# Patient Record
Sex: MALE | Race: WHITE | Employment: FULL TIME | URBAN - METROPOLITAN AREA
[De-identification: names, ages, dates, MRNs, and addresses within clinical notes are randomized per-mention and may not be internally consistent; named-entity substitution may affect disease eponyms.]

---

## 2022-09-08 ENCOUNTER — OFFICE VISIT (OUTPATIENT)
Dept: UROLOGY | Age: 42
End: 2022-09-08
Payer: COMMERCIAL

## 2022-09-08 DIAGNOSIS — N50.89 TESTICULAR MASS: Primary | ICD-10-CM

## 2022-09-08 DIAGNOSIS — Z30.09 VASECTOMY EVALUATION: ICD-10-CM

## 2022-09-08 LAB
BILIRUBIN, URINE, POC: NEGATIVE
BLOOD URINE, POC: NORMAL
GLUCOSE URINE, POC: NEGATIVE
KETONES, URINE, POC: NEGATIVE
LEUKOCYTE ESTERASE, URINE, POC: NEGATIVE
NITRITE, URINE, POC: NEGATIVE
PH, URINE, POC: 7 (ref 4.6–8)
PROTEIN,URINE, POC: NEGATIVE
SPECIFIC GRAVITY, URINE, POC: 1.02 (ref 1–1.03)
URINALYSIS CLARITY, POC: CLEAR
URINALYSIS COLOR, POC: YELLOW
UROBILINOGEN, POC: NORMAL

## 2022-09-08 PROCEDURE — 81003 URINALYSIS AUTO W/O SCOPE: CPT | Performed by: UROLOGY

## 2022-09-08 PROCEDURE — 99204 OFFICE O/P NEW MOD 45 MIN: CPT | Performed by: UROLOGY

## 2022-09-08 RX ORDER — TESTOSTERONE CYPIONATE 200 MG/ML
1 INJECTION INTRAMUSCULAR
COMMUNITY

## 2022-09-08 NOTE — PROGRESS NOTES
[facility]  [place] [address] [facility]  [phone]    Donn Nolan  : 1980     HPI   36 y.o., male returns in follow up for ***. Past Medical History:   Diagnosis Date    Hypercholesterolemia     high triglycerides    Hypertension     Sleep apnea     using CPAP     Past Surgical History:   Procedure Laterality Date    WISDOM TOOTH EXTRACTION       Current Outpatient Medications   Medication Sig Dispense Refill    testosterone cypionate (DEPOTESTOTERONE CYPIONATE) 200 MG/ML injection Inject 1 mg into the muscle. LISINOPRIL PO Take by mouth daily      chlorthalidone (HYGROTON) 25 MG tablet Take by mouth daily       No current facility-administered medications for this visit. No Known Allergies  Social History     Socioeconomic History    Marital status: Single     Spouse name: Not on file    Number of children: Not on file    Years of education: Not on file    Highest education level: Not on file   Occupational History    Not on file   Tobacco Use    Smoking status: Some Days    Smokeless tobacco: Never    Tobacco comments:     Quit smoking: 3-4 x month cigars   Substance and Sexual Activity    Alcohol use: Yes     Alcohol/week: 0.0 standard drinks    Drug use: No    Sexual activity: Not on file   Other Topics Concern    Not on file   Social History Narrative    caffeine intake -- 4 x week     Social Determinants of Health     Financial Resource Strain: Not on file   Food Insecurity: Not on file   Transportation Needs: Not on file   Physical Activity: Not on file   Stress: Not on file   Social Connections: Not on file   Intimate Partner Violence: Not on file   Housing Stability: Not on file     Family History   Problem Relation Age of Onset    Heart Disease Father     High Cholesterol Father     No Known Problems Brother        Review of Systems  All systems reviewed and are negative at this time.     Physical Exam  There were no vitals taken for this visit.  ***    Urinalysis  UA - Dipstick  Results for orders placed or performed in visit on 09/08/22   AMB POC URINALYSIS DIP STICK AUTO W/O MICRO   Result Value Ref Range    Color (UA POC) Yellow     Clarity (UA POC) Clear     Glucose, Urine, POC Negative Negative    Bilirubin, Urine, POC Negative Negative    KETONES, Urine, POC Negative Negative    Specific Gravity, Urine, POC 1.020 1.001 - 1.035    Blood (UA POC) Moderate Negative    pH, Urine, POC 7.0 4.6 - 8.0    Protein, Urine, POC Negative Negative    Urobilinogen, POC Normal     Nitrite, Urine, POC Negative Negative    Leukocyte Esterase, Urine, POC Negative Negative         UA - Micro  WBC - 0  RBC - 0  Bacteria - 0  Epith - 0    Assessment/Plan    ICD-10-CM    1. Testicular mass  N50.89 AMB POC URINALYSIS DIP STICK AUTO W/O MICRO     HCG, Tumor Marker     AFP Tumor Marker     Lactate Dehydrogenase     CT CHEST ABDOMEN PELVIS W CONTRAST      2.  Vasectomy evaluation  Z30.09           VALERY HICKEY, DO

## 2022-09-08 NOTE — PROGRESS NOTES
[facility][1] [facility]rology  [place], [address] [facility]  [phone]    Qing Avendano  : 1980     HPI   36 y.o., male seen in consultation for a R testicular mass. Pt seen for vas consult in . Never elected to have procedure due to cost.  Reports several wk history of R testicular pain and lower abd pain. Denies trauma. Reports R testis has become firmer over time. Denies wt loss. LOIS at [address] on 22 shows a diffusely abnormal R testis suspicious for testicular malignancy. His R epididymis is also abnormal and hypervascular. Epididymo-orchitis cannot be ruled out. He is still interested in a vasectomy. One child. Past Medical History:   Diagnosis Date    Hypercholesterolemia     high triglycerides    Hypertension     Sleep apnea     using CPAP     Past Surgical History:   Procedure Laterality Date    WISDOM TOOTH EXTRACTION       Current Outpatient Medications   Medication Sig Dispense Refill    testosterone cypionate (DEPOTESTOTERONE CYPIONATE) 200 MG/ML injection Inject 1 mg into the muscle. LISINOPRIL PO Take by mouth daily      chlorthalidone (HYGROTON) 25 MG tablet Take by mouth daily       No current facility-administered medications for this visit. No Known Allergies  Social History     Socioeconomic History    Marital status: Single     Spouse name: Not on file    Number of children: Not on file    Years of education: Not on file    Highest education level: Not on file   Occupational History    Not on file   Tobacco Use    Smoking status: Some Days    Smokeless tobacco: Never    Tobacco comments:     Quit smoking: 3-4 x month cigars   Substance and Sexual Activity    Alcohol use:  Yes     Alcohol/week: 0.0 standard drinks    Drug use: No    Sexual activity: Not on file   Other Topics Concern    Not on file   Social History Narrative    caffeine intake -- 4 x week     Social Determinants of Health     Financial Resource Strain: Not on file   Food Insecurity: Not on file   Transportation Needs: Not on file   Physical Activity: Not on file   Stress: Not on file   Social Connections: Not on file   Intimate Partner Violence: Not on file   Housing Stability: Not on file     Family History   Problem Relation Age of Onset    Heart Disease Father     High Cholesterol Father     No Known Problems Brother        Review of Systems  All systems reviewed and are negative at this time. Physical Exam  There were no vitals taken for this visit. General appearance - alert, well appearing, and in no distress  Mental status - alert, oriented to person, place, and time  Eyes - extraocular eye movements intact, sclera anicteric  Nose - normal and patent, no erythema, discharge or polyps  Mouth - mucous membranes moist  Abdomen - soft, nontender, nondistended, no masses or organomegaly  - Firm and enlarged R testis that resides in high R scrotum just distal to external ring. Lymphatic-  No palpable lymphadenopathy  Neurological -  normal speech, no focal findings or movement disorder noted  Musculoskeletal - no deformity or swelling  Extremities - no pedal edema, no clubbing or cyanosis  Skin - normal coloration and turgor      Urinalysis  UA - Dipstick  Results for orders placed or performed in visit on 09/08/22   AMB POC URINALYSIS DIP STICK AUTO W/O MICRO   Result Value Ref Range    Color (UA POC) Yellow     Clarity (UA POC) Clear     Glucose, Urine, POC Negative Negative    Bilirubin, Urine, POC Negative Negative    KETONES, Urine, POC Negative Negative    Specific Gravity, Urine, POC 1.020 1.001 - 1.035    Blood (UA POC) Moderate Negative    pH, Urine, POC 7.0 4.6 - 8.0    Protein, Urine, POC Negative Negative    Urobilinogen, POC Normal     Nitrite, Urine, POC Negative Negative    Leukocyte Esterase, Urine, POC Negative Negative         UA - Micro  WBC - 0  RBC - 5-10  Bacteria - 0  Epith - 0    Assessment/Plan    ICD-10-CM    1.  Testicular mass  N50.89 AMB POC URINALYSIS DIP STICK AUTO W/O MICRO     HCG, Tumor Marker     AFP Tumor Marker     Lactate Dehydrogenase     CT CHEST ABDOMEN PELVIS W CONTRAST        Exam and LOIS findings suspicious for malignancy. Will draw tumor markers and order CT C/A/P. Given that he is interested in a vasectomy we may scheduled this at [facility].  Case discussed with Dr. Irma Finch. All risks, benefits and alternatives to the above mentioned procedure were discussed and the patient is willing to proceed at this time. All risks, benefits and alternatives to the procedure were reviewed in detail with the patient and he is willing to proceed with a vasectomy. I did inform him that he is not cleared to discontinue contraception until he produces at least one semen analysis with azoospermia or rare non motile sperm following his vasectomy. I recommended that he bring his first post vasectomy semen specimen into the office no sooner than 8 weeks post op . I also informed him that this must be considered a permanent procedure. Should fertility be desired post vasectomy his options include vasectomy reversal and/or sperm retrieval.  I explained that these options can be expensive and not always successful. We discussed possible side effects during and following his vasectomy which include but are not limited to; bleeding, infection, hydrocele, sperm granuloma, acute pain, chronic pain, and/or testicular atrophy. I also discussed the risk of recanalization and the risk of pregnancy despite initial clearance of sperm and approval to stop contraception.        VALERY HICKEY, DO

## 2022-09-09 ENCOUNTER — NURSE ONLY (OUTPATIENT)
Dept: UROLOGY | Age: 42
End: 2022-09-09

## 2022-09-09 DIAGNOSIS — N50.89 TESTICULAR MASS: ICD-10-CM

## 2022-09-09 DIAGNOSIS — N50.89 TESTICULAR MASS: Primary | ICD-10-CM

## 2022-09-12 ENCOUNTER — NURSE ONLY (OUTPATIENT)
Dept: UROLOGY | Age: 42
End: 2022-09-12

## 2022-09-12 DIAGNOSIS — N50.89 TESTICULAR MASS: ICD-10-CM

## 2022-09-12 LAB
LDH SERPL L TO P-CCNC: 169 U/L (ref 100–190)
PSA SERPL-MCNC: 1.3 NG/ML

## 2022-09-13 LAB
AFP-TM SERPL-MCNC: 1.3 NG/ML
HCG INTACT+B SERPL-ACNC: 13 MIU/ML (ref 0–3)
TESTOST SERPL-MCNC: 418 NG/DL (ref 264–916)

## 2022-09-16 ENCOUNTER — HOSPITAL ENCOUNTER (OUTPATIENT)
Dept: CT IMAGING | Age: 42
Discharge: HOME | End: 2022-09-19

## 2022-09-16 ENCOUNTER — TELEPHONE (OUTPATIENT)
Dept: UROLOGY | Age: 42
End: 2022-09-16

## 2022-09-16 NOTE — TELEPHONE ENCOUNTER
----- Message from Jimi Rosa DO sent at 9/8/2022  5:12 PM EDT -----  Regarding: surg  R radical orchiectomy, L vasectomy  1h  Gen  Outpt  No labs  Ancef 2g iV preop  [facility] willing to do. Needs to be done within 2 wks.

## 2022-09-19 NOTE — TELEPHONE ENCOUNTER
[facility] can not accommodate for this week or next. Please look at your OR schedule and let me know where you want me to schedule this. You are full.   I could put with someone else if ok with you or oncall this wed night 9/21

## 2022-09-20 ENCOUNTER — PREP FOR PROCEDURE (OUTPATIENT)
Dept: UROLOGY | Age: 42
End: 2022-09-20

## 2022-09-20 DIAGNOSIS — N50.89 TESTICULAR MASS: Primary | ICD-10-CM

## 2022-09-20 RX ORDER — GEMFIBROZIL 600 MG/1
600 TABLET, FILM COATED ORAL 2 TIMES DAILY
COMMUNITY

## 2022-09-20 NOTE — PROGRESS NOTES
PLEASE CONTINUE TAKING ALL PRESCRIPTION MEDICATIONS UP TO THE DAY OF SURGERY UNLESS OTHERWISE DIRECTED BELOW. DISCONTINUE all vitamins and supplements 7 days prior to surgery. DISCONTINUE Non-Steriodal Anti-Inflammatory (NSAIDS) such as Advil and Aleve 5 days prior to surgery. Home Medications to take  the day of surgery    none           Home Medications   to Hold   Vitamins and supplements        Comments       On the day before surgery please take Acetaminophen 1000mg in the morning and then again before bed. You may substitute for Tylenol 650 mg. Please do not bring home medications with you on the day of surgery unless otherwise directed by your nurse. If you are instructed to bring home medications, please give them to your nurse as they will be administered by the nursing staff. If you have any questions, please call [facility] [phone] or [address] [place] [address] [phone]. A copy of this note was provided to the patient for reference.

## 2022-09-21 NOTE — PERIOP NOTE
Directly informed patient and or family member of pre op arrival time 0800 on 9/22. All questions answered. Pre op instructions reviewed. Left contact information for any additional questions or needs.

## 2022-09-22 ENCOUNTER — HOSPITAL ENCOUNTER (OUTPATIENT)
Age: 42
Setting detail: OUTPATIENT SURGERY
Discharge: HOME | End: 2022-09-22
Attending: UROLOGY | Admitting: UROLOGY
Payer: COMMERCIAL

## 2022-09-22 ENCOUNTER — ANESTHESIA EVENT (OUTPATIENT)
Dept: SURGERY | Age: 42
End: 2022-09-22
Payer: COMMERCIAL

## 2022-09-22 ENCOUNTER — ANESTHESIA (OUTPATIENT)
Dept: SURGERY | Age: 42
End: 2022-09-22
Payer: COMMERCIAL

## 2022-09-22 VITALS
HEART RATE: 88 BPM | HEIGHT: 71 IN | SYSTOLIC BLOOD PRESSURE: 114 MMHG | DIASTOLIC BLOOD PRESSURE: 68 MMHG | WEIGHT: 231.4 LBS | BODY MASS INDEX: 32.4 KG/M2 | OXYGEN SATURATION: 95 % | RESPIRATION RATE: 15 BRPM | TEMPERATURE: 97.2 F

## 2022-09-22 DIAGNOSIS — N50.89 SCROTAL MASS: ICD-10-CM

## 2022-09-22 DIAGNOSIS — N50.89 TESTICULAR MASS: Primary | ICD-10-CM

## 2022-09-22 LAB — POTASSIUM BLD-SCNC: 3.6 MMOL/L (ref 3.5–5.1)

## 2022-09-22 PROCEDURE — 6370000000 HC RX 637 (ALT 250 FOR IP): Performed by: ANESTHESIOLOGY

## 2022-09-22 PROCEDURE — 3600000002 HC SURGERY LEVEL 2 BASE: Performed by: UROLOGY

## 2022-09-22 PROCEDURE — 88309 TISSUE EXAM BY PATHOLOGIST: CPT

## 2022-09-22 PROCEDURE — 2580000003 HC RX 258: Performed by: ANESTHESIOLOGY

## 2022-09-22 PROCEDURE — 54530 REMOVAL OF TESTIS: CPT | Performed by: UROLOGY

## 2022-09-22 PROCEDURE — 3600000012 HC SURGERY LEVEL 2 ADDTL 15MIN: Performed by: UROLOGY

## 2022-09-22 PROCEDURE — 2500000003 HC RX 250 WO HCPCS: Performed by: NURSE ANESTHETIST, CERTIFIED REGISTERED

## 2022-09-22 PROCEDURE — 6360000002 HC RX W HCPCS: Performed by: NURSE ANESTHETIST, CERTIFIED REGISTERED

## 2022-09-22 PROCEDURE — 6360000002 HC RX W HCPCS: Performed by: UROLOGY

## 2022-09-22 PROCEDURE — 7100000001 HC PACU RECOVERY - ADDTL 15 MIN: Performed by: UROLOGY

## 2022-09-22 PROCEDURE — 7100000010 HC PHASE II RECOVERY - FIRST 15 MIN: Performed by: UROLOGY

## 2022-09-22 PROCEDURE — 2580000003 HC RX 258: Performed by: NURSE ANESTHETIST, CERTIFIED REGISTERED

## 2022-09-22 PROCEDURE — 7100000000 HC PACU RECOVERY - FIRST 15 MIN: Performed by: UROLOGY

## 2022-09-22 PROCEDURE — 3700000000 HC ANESTHESIA ATTENDED CARE: Performed by: UROLOGY

## 2022-09-22 PROCEDURE — 84132 ASSAY OF SERUM POTASSIUM: CPT

## 2022-09-22 PROCEDURE — 88305 TISSUE EXAM BY PATHOLOGIST: CPT

## 2022-09-22 PROCEDURE — 2709999900 HC NON-CHARGEABLE SUPPLY: Performed by: UROLOGY

## 2022-09-22 PROCEDURE — 2500000003 HC RX 250 WO HCPCS: Performed by: UROLOGY

## 2022-09-22 PROCEDURE — 6360000002 HC RX W HCPCS: Performed by: ANESTHESIOLOGY

## 2022-09-22 PROCEDURE — 7100000011 HC PHASE II RECOVERY - ADDTL 15 MIN: Performed by: UROLOGY

## 2022-09-22 PROCEDURE — 3700000001 HC ADD 15 MINUTES (ANESTHESIA): Performed by: UROLOGY

## 2022-09-22 RX ORDER — HYDROMORPHONE HYDROCHLORIDE 2 MG/ML
0.5 INJECTION, SOLUTION INTRAMUSCULAR; INTRAVENOUS; SUBCUTANEOUS EVERY 5 MIN PRN
Status: COMPLETED | OUTPATIENT
Start: 2022-09-22 | End: 2022-09-22

## 2022-09-22 RX ORDER — OXYCODONE HYDROCHLORIDE 5 MG/1
10 TABLET ORAL PRN
Status: COMPLETED | OUTPATIENT
Start: 2022-09-22 | End: 2022-09-22

## 2022-09-22 RX ORDER — LIDOCAINE HYDROCHLORIDE 10 MG/ML
1 INJECTION, SOLUTION INFILTRATION; PERINEURAL
Status: DISCONTINUED | OUTPATIENT
Start: 2022-09-22 | End: 2022-09-22 | Stop reason: HOSPADM

## 2022-09-22 RX ORDER — HYDROCODONE BITARTRATE AND ACETAMINOPHEN 5; 325 MG/1; MG/1
1 TABLET ORAL EVERY 6 HOURS PRN
Qty: 10 TABLET | Refills: 0 | Status: SHIPPED | OUTPATIENT
Start: 2022-09-22 | End: 2022-09-25

## 2022-09-22 RX ORDER — SUCCINYLCHOLINE CHLORIDE 20 MG/ML
INJECTION INTRAMUSCULAR; INTRAVENOUS PRN
Status: DISCONTINUED | OUTPATIENT
Start: 2022-09-22 | End: 2022-09-22 | Stop reason: SDUPTHER

## 2022-09-22 RX ORDER — NEOSTIGMINE METHYLSULFATE 1 MG/ML
INJECTION, SOLUTION INTRAVENOUS PRN
Status: DISCONTINUED | OUTPATIENT
Start: 2022-09-22 | End: 2022-09-22 | Stop reason: SDUPTHER

## 2022-09-22 RX ORDER — MIDAZOLAM HYDROCHLORIDE 2 MG/2ML
2 INJECTION, SOLUTION INTRAMUSCULAR; INTRAVENOUS
Status: DISCONTINUED | OUTPATIENT
Start: 2022-09-22 | End: 2022-09-22 | Stop reason: HOSPADM

## 2022-09-22 RX ORDER — OXYCODONE HYDROCHLORIDE 5 MG/1
5 TABLET ORAL PRN
Status: COMPLETED | OUTPATIENT
Start: 2022-09-22 | End: 2022-09-22

## 2022-09-22 RX ORDER — HYDROMORPHONE HYDROCHLORIDE 2 MG/ML
0.25 INJECTION, SOLUTION INTRAMUSCULAR; INTRAVENOUS; SUBCUTANEOUS EVERY 5 MIN PRN
Status: DISCONTINUED | OUTPATIENT
Start: 2022-09-22 | End: 2022-09-22 | Stop reason: HOSPADM

## 2022-09-22 RX ORDER — EPHEDRINE SULFATE/0.9% NACL/PF 50 MG/5 ML
SYRINGE (ML) INTRAVENOUS PRN
Status: DISCONTINUED | OUTPATIENT
Start: 2022-09-22 | End: 2022-09-22 | Stop reason: SDUPTHER

## 2022-09-22 RX ORDER — LIDOCAINE HYDROCHLORIDE 20 MG/ML
INJECTION, SOLUTION EPIDURAL; INFILTRATION; INTRACAUDAL; PERINEURAL PRN
Status: DISCONTINUED | OUTPATIENT
Start: 2022-09-22 | End: 2022-09-22 | Stop reason: SDUPTHER

## 2022-09-22 RX ORDER — ROCURONIUM BROMIDE 10 MG/ML
INJECTION, SOLUTION INTRAVENOUS PRN
Status: DISCONTINUED | OUTPATIENT
Start: 2022-09-22 | End: 2022-09-22 | Stop reason: SDUPTHER

## 2022-09-22 RX ORDER — SODIUM CHLORIDE 0.9 % (FLUSH) 0.9 %
5-40 SYRINGE (ML) INJECTION EVERY 12 HOURS SCHEDULED
Status: DISCONTINUED | OUTPATIENT
Start: 2022-09-22 | End: 2022-09-22 | Stop reason: HOSPADM

## 2022-09-22 RX ORDER — GLYCOPYRROLATE 0.2 MG/ML
INJECTION INTRAMUSCULAR; INTRAVENOUS PRN
Status: DISCONTINUED | OUTPATIENT
Start: 2022-09-22 | End: 2022-09-22 | Stop reason: SDUPTHER

## 2022-09-22 RX ORDER — PROPOFOL 10 MG/ML
INJECTION, EMULSION INTRAVENOUS PRN
Status: DISCONTINUED | OUTPATIENT
Start: 2022-09-22 | End: 2022-09-22 | Stop reason: SDUPTHER

## 2022-09-22 RX ORDER — ONDANSETRON 2 MG/ML
INJECTION INTRAMUSCULAR; INTRAVENOUS PRN
Status: DISCONTINUED | OUTPATIENT
Start: 2022-09-22 | End: 2022-09-22 | Stop reason: SDUPTHER

## 2022-09-22 RX ORDER — DEXAMETHASONE SODIUM PHOSPHATE 4 MG/ML
INJECTION, SOLUTION INTRA-ARTICULAR; INTRALESIONAL; INTRAMUSCULAR; INTRAVENOUS; SOFT TISSUE PRN
Status: DISCONTINUED | OUTPATIENT
Start: 2022-09-22 | End: 2022-09-22 | Stop reason: SDUPTHER

## 2022-09-22 RX ORDER — BUPIVACAINE HYDROCHLORIDE 5 MG/ML
INJECTION, SOLUTION EPIDURAL; INTRACAUDAL PRN
Status: DISCONTINUED | OUTPATIENT
Start: 2022-09-22 | End: 2022-09-22 | Stop reason: HOSPADM

## 2022-09-22 RX ORDER — SODIUM CHLORIDE 9 MG/ML
INJECTION, SOLUTION INTRAVENOUS PRN
Status: DISCONTINUED | OUTPATIENT
Start: 2022-09-22 | End: 2022-09-22 | Stop reason: HOSPADM

## 2022-09-22 RX ORDER — SODIUM CHLORIDE, SODIUM LACTATE, POTASSIUM CHLORIDE, CALCIUM CHLORIDE 600; 310; 30; 20 MG/100ML; MG/100ML; MG/100ML; MG/100ML
INJECTION, SOLUTION INTRAVENOUS CONTINUOUS
Status: DISCONTINUED | OUTPATIENT
Start: 2022-09-22 | End: 2022-09-22 | Stop reason: HOSPADM

## 2022-09-22 RX ORDER — FENTANYL CITRATE 50 UG/ML
INJECTION, SOLUTION INTRAMUSCULAR; INTRAVENOUS PRN
Status: DISCONTINUED | OUTPATIENT
Start: 2022-09-22 | End: 2022-09-22 | Stop reason: SDUPTHER

## 2022-09-22 RX ORDER — ONDANSETRON 2 MG/ML
4 INJECTION INTRAMUSCULAR; INTRAVENOUS
Status: DISCONTINUED | OUTPATIENT
Start: 2022-09-22 | End: 2022-09-22 | Stop reason: HOSPADM

## 2022-09-22 RX ORDER — DEXTROSE MONOHYDRATE 100 MG/ML
INJECTION, SOLUTION INTRAVENOUS CONTINUOUS PRN
Status: DISCONTINUED | OUTPATIENT
Start: 2022-09-22 | End: 2022-09-22 | Stop reason: HOSPADM

## 2022-09-22 RX ORDER — SODIUM CHLORIDE 0.9 % (FLUSH) 0.9 %
5-40 SYRINGE (ML) INJECTION PRN
Status: DISCONTINUED | OUTPATIENT
Start: 2022-09-22 | End: 2022-09-22 | Stop reason: HOSPADM

## 2022-09-22 RX ORDER — LABETALOL HYDROCHLORIDE 5 MG/ML
10 INJECTION, SOLUTION INTRAVENOUS
Status: DISCONTINUED | OUTPATIENT
Start: 2022-09-22 | End: 2022-09-22 | Stop reason: HOSPADM

## 2022-09-22 RX ORDER — HYDROMORPHONE HYDROCHLORIDE 2 MG/ML
0.5 INJECTION, SOLUTION INTRAMUSCULAR; INTRAVENOUS; SUBCUTANEOUS
Status: DISCONTINUED | OUTPATIENT
Start: 2022-09-22 | End: 2022-09-22 | Stop reason: HOSPADM

## 2022-09-22 RX ORDER — HYDROMORPHONE HYDROCHLORIDE 2 MG/ML
0.25 INJECTION, SOLUTION INTRAMUSCULAR; INTRAVENOUS; SUBCUTANEOUS
Status: DISCONTINUED | OUTPATIENT
Start: 2022-09-22 | End: 2022-09-22 | Stop reason: HOSPADM

## 2022-09-22 RX ORDER — ACETAMINOPHEN 500 MG
1000 TABLET ORAL ONCE
Status: COMPLETED | OUTPATIENT
Start: 2022-09-22 | End: 2022-09-22

## 2022-09-22 RX ADMIN — PHENYLEPHRINE HYDROCHLORIDE 1 ML: 10 INJECTION INTRAVENOUS at 11:04

## 2022-09-22 RX ADMIN — PHENYLEPHRINE HYDROCHLORIDE 1 ML: 10 INJECTION INTRAVENOUS at 11:19

## 2022-09-22 RX ADMIN — PROPOFOL 200 MG: 10 INJECTION, EMULSION INTRAVENOUS at 10:52

## 2022-09-22 RX ADMIN — LIDOCAINE HYDROCHLORIDE 100 MG: 20 INJECTION, SOLUTION EPIDURAL; INFILTRATION; INTRACAUDAL; PERINEURAL at 10:52

## 2022-09-22 RX ADMIN — PROPOFOL 100 MG: 10 INJECTION, EMULSION INTRAVENOUS at 10:53

## 2022-09-22 RX ADMIN — GLYCOPYRROLATE 0.4 MG: 0.2 INJECTION, SOLUTION INTRAMUSCULAR; INTRAVENOUS at 11:57

## 2022-09-22 RX ADMIN — PHENYLEPHRINE HYDROCHLORIDE 2 ML: 10 INJECTION INTRAVENOUS at 11:27

## 2022-09-22 RX ADMIN — FENTANYL CITRATE 50 MCG: 50 INJECTION, SOLUTION INTRAMUSCULAR; INTRAVENOUS at 11:11

## 2022-09-22 RX ADMIN — OXYCODONE 10 MG: 5 TABLET ORAL at 12:30

## 2022-09-22 RX ADMIN — HYDROMORPHONE HYDROCHLORIDE 0.5 MG: 2 INJECTION INTRAMUSCULAR; INTRAVENOUS; SUBCUTANEOUS at 12:45

## 2022-09-22 RX ADMIN — ONDANSETRON 4 MG: 2 INJECTION INTRAMUSCULAR; INTRAVENOUS at 11:07

## 2022-09-22 RX ADMIN — ROCURONIUM BROMIDE 5 MG: 50 INJECTION, SOLUTION INTRAVENOUS at 10:52

## 2022-09-22 RX ADMIN — HYDROMORPHONE HYDROCHLORIDE 0.5 MG: 2 INJECTION INTRAMUSCULAR; INTRAVENOUS; SUBCUTANEOUS at 12:40

## 2022-09-22 RX ADMIN — FENTANYL CITRATE 50 MCG: 50 INJECTION, SOLUTION INTRAMUSCULAR; INTRAVENOUS at 10:52

## 2022-09-22 RX ADMIN — SODIUM CHLORIDE, POTASSIUM CHLORIDE, SODIUM LACTATE AND CALCIUM CHLORIDE: 600; 310; 30; 20 INJECTION, SOLUTION INTRAVENOUS at 08:36

## 2022-09-22 RX ADMIN — Medication 2 G: at 11:01

## 2022-09-22 RX ADMIN — ACETAMINOPHEN 1000 MG: 500 TABLET, FILM COATED ORAL at 08:35

## 2022-09-22 RX ADMIN — DEXAMETHASONE SODIUM PHOSPHATE 4 MG: 4 INJECTION, SOLUTION INTRAMUSCULAR; INTRAVENOUS at 11:07

## 2022-09-22 RX ADMIN — Medication 15 MG: at 11:32

## 2022-09-22 RX ADMIN — ROCURONIUM BROMIDE 30 MG: 50 INJECTION, SOLUTION INTRAVENOUS at 10:58

## 2022-09-22 RX ADMIN — Medication 3 MG: at 11:57

## 2022-09-22 RX ADMIN — Medication 180 MG: at 10:52

## 2022-09-22 ASSESSMENT — PAIN - FUNCTIONAL ASSESSMENT: PAIN_FUNCTIONAL_ASSESSMENT: 0-10

## 2022-09-22 ASSESSMENT — PAIN DESCRIPTION - DESCRIPTORS: DESCRIPTORS: ACHING

## 2022-09-22 ASSESSMENT — PAIN SCALES - GENERAL
PAINLEVEL_OUTOF10: 8
PAINLEVEL_OUTOF10: 5
PAINLEVEL_OUTOF10: 0

## 2022-09-22 ASSESSMENT — PAIN DESCRIPTION - LOCATION: LOCATION: GROIN

## 2022-09-22 ASSESSMENT — PAIN DESCRIPTION - ORIENTATION: ORIENTATION: RIGHT

## 2022-09-22 NOTE — BRIEF OP NOTE
Brief Postoperative Note      Patient: Dona Cruz  YOB: 1980  MRN: [id number]    Date of Procedure: 9/22/2022    Pre-Op Diagnosis: R testicular mass    Post-Op Diagnosis: Same       Procedure(s):  ORCHIECTOMY RADICAL RIGHT    Surgeon(s):  Andrez Pearson DO    Assistant:  * No surgical staff found *    Anesthesia: General    Estimated Blood Loss (mL): <3HN    Complications: none immediate    Specimens:   ID Type Source Tests Collected by Time Destination   A : Right Testicle and Spermatic cord Tissue Scrotum SURGICAL PATHOLOGY Corby Humphrey DO 9/22/2022 1124        Implants:  * No implants in log *      Drains: * No LDAs found *    Findings: see op note    Electronically signed by Sasha Reed DO on 9/22/2022 at 12:51 PM

## 2022-09-22 NOTE — ANESTHESIA POSTPROCEDURE EVALUATION
Department of Anesthesiology  Postprocedure Note    Patient: Juanita Sands  MRN: [id number]  YOB: 1980  Date of evaluation: 9/22/2022      Procedure Summary     Date: 09/22/22 Room / Location: [facility] MAIN OR 08 / [facility] MAIN OR    Anesthesia Start: 1045 Anesthesia Stop: 1217    Procedure: ORCHIECTOMY RADICAL RIGHT (Right: Scrotum) Diagnosis:       Scrotal mass      (Scrotal mass [N50.89])    Providers: León Simmons DO Responsible Provider: Meir Linn MD    Anesthesia Type: general ASA Status: 2          Anesthesia Type: No value filed.     Dat Phase I: Dat Score: 8    Dat Phase II:        Anesthesia Post Evaluation    Patient location during evaluation: PACU  Patient participation: complete - patient participated  Level of consciousness: awake and alert  Pain score: 3  Airway patency: patent  Nausea & Vomiting: no nausea and no vomiting  Complications: no  Cardiovascular status: hemodynamically stable  Respiratory status: acceptable, nonlabored ventilation and spontaneous ventilation  Hydration status: euvolemic  Comments: BP (!) 97/52   Pulse 72   Temp 97.2 °F (36.2 °C) (Infrared)   Resp 14   Ht 5' 11\" (1.803 m)   Wt 231 lb 6.4 oz (105 kg)   SpO2 95%   BMI 32.27 kg/m²     Multimodal analgesia pain management approach

## 2022-09-22 NOTE — PERIOP NOTE
Discharge instructions reviewed with wife- no questions or concerns at this time. No questions or concerns at this time.

## 2022-09-22 NOTE — ANESTHESIA PRE PROCEDURE
Department of Anesthesiology  Preprocedure Note       Name:  Amber Fernandez   Age:  36 y.o.  :  1980                                          MRN:  [id number]         Date:  2022      Surgeon: Bari Tate):  Jo Humphrey DO    Procedure: Procedure(s):  ORCHIECTOMY RADICAL RIGHT    Medications prior to admission:   Prior to Admission medications    Medication Sig Start Date End Date Taking? Authorizing Provider   gemfibrozil (LOPID) 600 MG tablet Take 600 mg by mouth 2 times daily   Yes Historical Provider, MD   Multiple Vitamin (MULTI-VITAMIN DAILY PO) Take 1 tablet by mouth daily   Yes Historical Provider, MD   testosterone cypionate (DEPOTESTOTERONE CYPIONATE) 200 MG/ML injection Inject 1 mg into the muscle.  Every 4 days    Historical Provider, MD   LISINOPRIL PO Take 40 mg by mouth daily    Ar Automatic Reconciliation   chlorthalidone (HYGROTON) 25 MG tablet Take by mouth daily    Ar Automatic Reconciliation       Current medications:    Current Facility-Administered Medications   Medication Dose Route Frequency Provider Last Rate Last Admin    lidocaine 1 % injection 1 mL  1 mL IntraDERmal Once PRN Dann Silva MD        acetaminophen (TYLENOL) tablet 1,000 mg  1,000 mg Oral Once Dann Silva MD        HYDROmorphone HCl PF (DILAUDID) injection 0.25 mg  0.25 mg IntraVENous Once PRN Dann Silva MD        Or    HYDROmorphone HCl PF (DILAUDID) injection 0.5 mg  0.5 mg IntraVENous Once PRN Dann Silva MD        lactated ringers infusion   IntraVENous Continuous Dann Silva MD        sodium chloride flush 0.9 % injection 5-40 mL  5-40 mL IntraVENous 2 times per day Dann Silva MD        sodium chloride flush 0.9 % injection 5-40 mL  5-40 mL IntraVENous PRN Dann Silva MD        0.9 % sodium chloride infusion   IntraVENous PRN Dann Silva MD        midazolam PF (VERSED) injection 2 mg  2 mg IntraVENous Once PRN Dann Silva MD        ceFAZolin (ANCEF) 2000 mg in sterile water 20 mL IV syringe  2,000 mg IntraVENous On Call to [address] [employer] [address]            Allergies:  No Known Allergies    Problem List:    Patient Active Problem List   Diagnosis Code    Hypersomnia due to medical condition G47.14    Decreased libido R68.82    Low testosterone R79.89    Obesity (BMI 30.0-34. 9) E66.9    Sleep apnea G47.30    Hypertriglyceridemia E78.1    Scrotal mass N50.89       Past Medical History:        Diagnosis Date    Hypercholesterolemia     high triglycerides    Hypertension     controlled with med    Sleep apnea     per pt-- hasnt used in last yr due to wt loss and pressures \"arent right\"    Testicle cancer ([address] 09/2022    right       Past Surgical History:        Procedure Laterality Date    WISDOM TOOTH EXTRACTION      as teen       Social History:    Social History     Tobacco Use    Smoking status: Some Days    Smokeless tobacco: Never    Tobacco comments:     Quit smoking: 3-4 x month cigars---- never smoked cigarettes   Substance Use Topics    Alcohol use: Yes     Comment: occ                                Ready to quit: Not Answered  Counseling given: Not Answered  Tobacco comments: Quit smoking: 3-4 x month cigars---- never smoked cigarettes      Vital Signs (Current):   Vitals:    09/20/22 1045 09/22/22 0816   BP:  123/76   Pulse:  97   Resp:  16   Temp:  98.3 °F (36.8 °C)   TempSrc:  Temporal   Weight: 230 lb (104.3 kg) 231 lb 6.4 oz (105 kg)   Height: 5' 11\" (1.803 m) 5' 11\" (1.803 m)                                              BP Readings from Last 3 Encounters:   09/22/22 123/76       NPO Status: Time of last liquid consumption: 0000                        Time of last solid consumption: 0000                        Date of last liquid consumption: 09/21/22                        Date of last solid food consumption: 09/21/22    BMI:   Wt Readings from Last 3 Encounters:   09/22/22 231 lb 6.4 oz (105 kg)     Body mass index is 32.27 kg/m². CBC: No results found for: WBC, RBC, HGB, HCT, MCV, RDW, PLT    CMP: No results found for: NA, K, CL, CO2, BUN, CREATININE, GFRAA, AGRATIO, LABGLOM, GLUCOSE, GLU, PROT, CALCIUM, BILITOT, ALKPHOS, AST, ALT    POC Tests: No results for input(s): POCGLU, POCNA, POCK, POCCL, POCBUN, POCHEMO, POCHCT in the last 72 hours. Coags: No results found for: PROTIME, INR, APTT    HCG (If Applicable): No results found for: PREGTESTUR, PREGSERUM, HCG, HCGQUANT     ABGs: No results found for: PHART, PO2ART, AWT8HWZ, RWT1NBY, BEART, Y7QTTQBT     Type & Screen (If Applicable):  No results found for: LABABO, LABRH    Drug/Infectious Status (If Applicable):  No results found for: HIV, HEPCAB    COVID-19 Screening (If Applicable): No results found for: COVID19        Anesthesia Evaluation  Patient summary reviewed and Nursing notes reviewed  Airway: Mallampati: II  TM distance: >3 FB   Neck ROM: full  Mouth opening: > = 3 FB   Dental: normal exam         Pulmonary: breath sounds clear to auscultation  (+) sleep apnea:                             Cardiovascular:  Exercise tolerance: good (>4 METS),   (+) hypertension: mild, hyperlipidemia        Rhythm: regular  Rate: normal                    Neuro/Psych:   Negative Neuro/Psych ROS              GI/Hepatic/Renal: Neg GI/Hepatic/Renal ROS            Endo/Other:    (+) malignancy/cancer (Testicle cancer - right ). Abdominal:             Vascular: negative vascular ROS. Other Findings:           Anesthesia Plan      general     ASA 2       Induction: intravenous. MIPS: Postoperative opioids intended and Prophylactic antiemetics administered. Anesthetic plan and risks discussed with patient and spouse. Plan discussed with CRNA.                     Jack Galo MD   9/22/2022

## 2022-09-22 NOTE — OP NOTE
[address] [facility]  OPERATIVE REPORT    Name:  Bertha Edouard  MR#:  [id number]  :  1980  ACCOUNT #:  [de-identified]  DATE OF SERVICE:  2022    PREOPERATIVE DIAGNOSIS:  Right testicular mass. POSTOPERATIVE DIAGNOSIS:  Right testicular mass. PROCEDURE PERFORMED:  Right radical orchiectomy    SURGEON:  Michelle Humphrey DO    ASSISTANT:  None    ANESTHESIA:  General.    COMPLICATIONS:  None immediate. SPECIMENS REMOVED:  Right testes and spermatic cord. IMPLANTS:  None. ESTIMATED BLOOD LOSS:  Less than 5 mL. CLINICAL HISTORY:  This is a 41-year-old gentleman who recently underwent a scrotal ultrasound on 2022 for right testicular pain. This revealed diffusely abnormal right testis suspicious for testicular cancer. Preoperative tumor marker showed an elevated hCG at 13. His AFP was normal at 1.3. CT on 2022 showed a tiny right lower lobe pulmonary nodule with multiple right renal stones. No signs of metastasis are seen. All risks, benefits, and alternatives to above-mentioned procedure have been reviewed and he is willing to proceed at this time. PROCEDURE:  The patient's consent was obtained. The patient was brought back to the operating room. At which time he was placed in the supine position. After the uneventful induction of general anesthesia, the patient's genital and lower abdominal areas were prepped and draped and a sterile field applied. A right oblique inguinal incision was made. Dissection carried down towards the external oblique aponeurosis. The external ring was identified and the external oblique fascia was incised along its fibers. An ilioinguinal nerve was identified and preserved. The cord was encircled bluntly using my finger and then encircled using a quarter-inch Penrose drain. This was doubly tied and secured. Dissection then carried towards the right testis.   The entire cord and testis were dissected free using combination of blunt dissection and electrocautery. I essentially dissected the right testis and cord down to the internal ring where the cord was doubly clamped and divided. The proximal cord was oversewn using 0 Vicryl suture in a stick tie fashion. Silk sutures were also placed around the base of this cord. Following complete hemostasis, the wound was irrigated and inspected. No bleeding was seen. The ilioinguinal nerve was preserved and placed under the external oblique fascia. The external oblique fascia was then closed from the external ring to the internal ring using 0 Vicryl in a running fashion. The Vitlaiy fascia was then reapproximated using 3-0 Vicryl in a running fashion and the skin was closed using 4-0 Monocryl in a running subcuticular fashion. The patient tolerated the procedure well. He will follow up in the office in 2 weeks for postoperative check.         [address] [address], DO      [id number]  D:  09/22/2022 12:31  T:  09/22/2022 16:37  JOB #:  [id number]

## 2022-09-30 ENCOUNTER — TELEPHONE (OUTPATIENT)
Dept: UROLOGY | Age: 42
End: 2022-09-30

## 2022-09-30 NOTE — TELEPHONE ENCOUNTER
[facility] with appt info    ----- Message from Herber Montes DO sent at 9/23/2022  1:15 PM EDT -----  I reviewed with pt. Pt needs 2 wk post op appt. NP is fine.

## 2022-10-05 ENCOUNTER — OFFICE VISIT (OUTPATIENT)
Dept: UROLOGY | Age: 42
End: 2022-10-05
Payer: COMMERCIAL

## 2022-10-05 DIAGNOSIS — Z30.09 VASECTOMY EVALUATION: ICD-10-CM

## 2022-10-05 DIAGNOSIS — N50.89 TESTICULAR MASS: Primary | ICD-10-CM

## 2022-10-05 LAB
BILIRUBIN, URINE, POC: NEGATIVE
BLOOD URINE, POC: NEGATIVE
GLUCOSE URINE, POC: NEGATIVE
KETONES, URINE, POC: NEGATIVE
LEUKOCYTE ESTERASE, URINE, POC: NEGATIVE
NITRITE, URINE, POC: NEGATIVE
PH, URINE, POC: 6 (ref 4.6–8)
PROTEIN,URINE, POC: NEGATIVE
SPECIFIC GRAVITY, URINE, POC: 1.02 (ref 1–1.03)
URINALYSIS CLARITY, POC: ABNORMAL
URINALYSIS COLOR, POC: ABNORMAL
UROBILINOGEN, POC: ABNORMAL

## 2022-10-05 PROCEDURE — 99024 POSTOP FOLLOW-UP VISIT: CPT | Performed by: NURSE PRACTITIONER

## 2022-10-05 PROCEDURE — 81003 URINALYSIS AUTO W/O SCOPE: CPT | Performed by: NURSE PRACTITIONER

## 2022-10-05 NOTE — PROGRESS NOTES
[facility]rology  [address] [facility]    [address], [address] [facility]  [address] [facility]  : 1980    Chief Complaint   Patient presents with    Follow-up     2 week post op          HPI     Trey Linda is a 36 y.o. male being seen today for post op visit. Reports R testis has become firmer over time. Denies wt loss. LOIS at [address] on [address] shows a diffusely abnormal R testis suspicious for testicular malignancy. His R epididymis is also abnormal and hypervascular. He was sent for C/A/P w contrast revealing asymmetric increased attenuation of right testicle. Elevated HCG. He is s/p Right radical orchiectomy on 22. He is feeling well. Denies wound drainage. Afebrile. Voiding wo issue. 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   this was prev discussed w pt by Dr. Taylor Hoffman. Past Medical History:   Diagnosis Date    Hypercholesterolemia     high triglycerides    Hypertension     controlled with med    Sleep apnea     per pt-- hasnt used in last yr due to wt loss and pressures \"arent right\"    Testicle cancer ([facility] [address] 2022    right     Past Surgical History:   Procedure Laterality Date    TESTICLE REMOVAL Right 2022    ORCHIECTOMY RADICAL RIGHT performed by Lary Lewis DO at [facility] MAIN OR    WISDOM TOOTH EXTRACTION      as teen     Current Outpatient Medications   Medication Sig Dispense Refill    gemfibrozil (LOPID) 600 MG tablet Take 600 mg by mouth 2 times daily      Multiple Vitamin (MULTI-VITAMIN DAILY PO) Take 1 tablet by mouth daily      testosterone cypionate (DEPOTESTOTERONE CYPIONATE) 200 MG/ML injection Inject 1 mg into the muscle.  Every 4 days      LISINOPRIL PO Take 40 mg by mouth daily      chlorthalidone (HYGROTON) 25 MG tablet Take by mouth daily       No current facility-administered medications for this visit.      No Known Allergies  Social History     Socioeconomic History    Marital status: Single     Spouse name: Not on file    Number of children: Not on file    Years of education: Not on file    Highest education level: Not on file   Occupational History    Not on file   Tobacco Use    Smoking status: Some Days    Smokeless tobacco: Never    Tobacco comments:     Quit smoking: 3-4 x month cigars---- never smoked cigarettes   Vaping Use    Vaping Use: Never used   Substance and Sexual Activity    Alcohol use: Yes     Comment: occ    Drug use: No    Sexual activity: Not on file   Other Topics Concern    Not on file   Social History Narrative    caffeine intake -- 4 x week     Social Determinants of Health     Financial Resource Strain: Not on file   Food Insecurity: Not on file   Transportation Needs: Not on file   Physical Activity: Not on file   Stress: Not on file   Social Connections: Not on file   Intimate Partner Violence: Not on file   Housing Stability: Not on file     Family History   Problem Relation Age of Onset    Heart Disease Father     High Cholesterol Father     No Known Problems Brother        Review of Systems  Constitutional: Negative  GI: Neg GI ROS  Genitourinary: Genitourinary negative    Urinalysis  UA - Dipstick  Results for orders placed or performed in visit on 10/05/22   AMB POC URINALYSIS DIP STICK AUTO W/O MICRO   Result Value Ref Range    Color (UA POC)      Clarity (UA POC)      Glucose, Urine, POC Negative Negative    Bilirubin, Urine, POC Negative Negative    KETONES, Urine, POC Negative Negative    Specific Gravity, Urine, POC 1.025 1.001 - 1.035    Blood (UA POC) Negative Negative    pH, Urine, POC 6.0 4.6 - 8.0    Protein, Urine, POC Negative Negative    Urobilinogen, POC 2 mg/dL     Nitrite, Urine, POC Negative Negative    Leukocyte Esterase, Urine, POC Negative Negative       PHYSICAL EXAM    General appearance - well appearing and in no distress  Mental status - alert, oriented to person, place, and time  Neck - supple, no significant adenopathy  Chest/Lung-  Quiet, even and easy respiratory effort without use of accessory muscles  Skin - normal coloration and turgor, no rashes  Right inguinal SI intact w steri strips      Assessment and Plan    ICD-10-CM    1. Testicular mass  N50.89 AMB POC URINALYSIS DIP STICK AUTO W/O MICRO     [facility] - Daniela Menon MD, Hematology & Oncology, [place]      2. Vasectomy evaluation  Z30.09 AMB POC URINALYSIS DIP STICK AUTO W/O MICRO        Steri strips removed from SI. Wound is essentially healed. Wound care discussed. Path report discussed w Dr. Jamari Peters. Will refer to onc for further recs. ROV in 3 months w Dr. Jamari Peters. To call sooner if needed. [facility] Jay Zelaya is supervising physician today and he approves plan of care.

## 2022-10-06 NOTE — PROGRESS NOTES
New Patient Abstract  Annette Delonte      Reason for Referral: Testicular Mass    Referring Provider: CHIN Simons - CNP    Primary Care Provider: Dr. Jolanda Harada    Family History of Cancer/Hematologic Disorders: No family history of oncological or hematological disorders documented. Presenting Symptoms: Right testicular and lower abdominal pain x several weeks and progressively firmer right testicle    Narrative with recent with Results/Procedures/Biopsies and Dates completed: Mr. Maddi Lamb is a 50-year-old white male with a history of tobacco use (smokes cigars 3-4 times/month), sleep apnea, HTN, hypercholesterolemia, and high triglycerides. He presented to [facility] on 9/8/22 reporting a several week history of right testicular and lower abdominal pain. He also reported that his right testis had become firmer over time. Per Urology notes, patient had a scrotal ultrasound done at [address] [place] [address] on 8/24/22 which showed a diffusely abnormal right testis, suspicious for testicular malignancy. His right epididymis was also abnormal and hypervascular, and epididymo-orchitis could not be ruled out ([facility] but this was unavailable). Tumor markers and CT C/A/P were ordered. Labs drawn on 9/12/22 were significant for elevated beta HCG at 13. AFP was WNL at 1.30; PSA was WNL at 1.3, LD was WNL at 169; and testosterone was WNL at 418. CT of the chest, abdomen, and pelvis was performed on 9/16/22. Despite limited evaluation of testicular mass by CT, there was asymmetric increased attenuation of the right testis noted, which could reflect a testicular malignancy; no evidence of metastatic lymphadenopathy; a 3 mm right lower lobe lung nodule; and multiple non-obstructing right renal calculi with mild right hydroureteronephrosis and no visualized obstructing ureteral stone.     On 9/22/22 patient underwent right radical orchiectomy with Dr. Xenia Traylor [place]. Final pathology from the right testicle and spermatic cord revealed seminoma, confined to testicle, measuring 4.5 cm in greatest dimension with uninvolved margins. Mr. Roel Fisher is recovering well postoperatively and is now referred to [facility], per Urology, for Medical Oncology evaluation and further treatment recommendations. 9/12/22 15:50   AFP (TUMOR MARKER),FETO1 1.30   Prostatic Specific Ag 1.3      9/12/22 15:50      Testosterone 418      9/12/22 15:50   HCG, BETA, HCGTLT 13 (H)     CT CHEST, ABDOMEN, AND PELVIS WITH CONTRAST 9/16/22  FINDINGS:  CHEST:   Mediastinum and visualized thyroid: Normal.  Heart: Normal.   Large Vessels: Normal.   Pleura: Normal.   Lungs: Calcified right upper lobe lung nodules. 3 mm posterior right lower lobe lung nodule. Airways: Normal.  ABDOMEN AND PELVIS:   Liver: Normal in size and morphology. No focal lesions. Gallbladder/biliary: Normal gallbladder. No biliary dilatation. Pancreas: Normal.   Spleen: Normal.   Adrenals: Normal.   Kidneys: Nonobstructing right renal calculi. Mild right hydroureteronephrosis. No visualized stone within the ureter. Bladder: Normal.   Pelvic organs: The testicles are not well evaluated by CT, however, there is increased attenuation of the right testis. Gastrointestinal: Normal.   Peritoneum/retroperitoneum: Normal.   Lymph nodes: Normal.  Vessels: Incidental retroaortic left renal vein. Bones/Soft tissues: Normal.   IMPRESSION:  1. Limited evaluation of testicular mass by CT, however there is asymmetric increased attenuation of the right testis, which could reflect a testicular malignancy. 2.  No evidence of metastatic lymphadenopathy. 3.  3 mm right lower lobe lung nodule. 4.  Multiple nonobstructing right renal calculi. Mild right hydroureteronephrosis. No visualized obstructing ureteral stone.     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 Notes from Referring Provider: None    Other Pertinent Information: None    Presented at Tumor Board:   No

## 2022-10-07 ENCOUNTER — CLINICAL DOCUMENTATION (OUTPATIENT)
Dept: CASE MANAGEMENT | Age: 42
End: 2022-10-07

## 2022-10-07 ENCOUNTER — HOSPITAL ENCOUNTER (OUTPATIENT)
Dept: LAB | Age: 42
Discharge: HOME | End: 2022-10-10
Payer: COMMERCIAL

## 2022-10-07 ENCOUNTER — OFFICE VISIT (OUTPATIENT)
Dept: ONCOLOGY | Age: 42
End: 2022-10-07
Payer: COMMERCIAL

## 2022-10-07 VITALS
DIASTOLIC BLOOD PRESSURE: 84 MMHG | HEIGHT: 72 IN | TEMPERATURE: 97.9 F | OXYGEN SATURATION: 97 % | HEART RATE: 102 BPM | BODY MASS INDEX: 31.4 KG/M2 | RESPIRATION RATE: 13 BRPM | WEIGHT: 231.8 LBS | SYSTOLIC BLOOD PRESSURE: 124 MMHG

## 2022-10-07 DIAGNOSIS — C62.91 SEMINOMA OF RIGHT TESTIS, STAGE 1 (HCC): Primary | ICD-10-CM

## 2022-10-07 DIAGNOSIS — C62.91 SEMINOMA OF RIGHT TESTIS, STAGE 1 (HCC): ICD-10-CM

## 2022-10-07 LAB
AFP-TM SERPL-MCNC: 1 NG/ML
ALBUMIN SERPL-MCNC: 4.1 G/DL (ref 3.5–5)
ALBUMIN/GLOB SERPL: 1.1 {RATIO} (ref 1.2–3.5)
ALP SERPL-CCNC: 50 U/L (ref 50–136)
ALT SERPL-CCNC: 39 U/L (ref 12–65)
ANION GAP SERPL CALC-SCNC: 6 MMOL/L (ref 4–13)
AST SERPL-CCNC: 23 U/L (ref 15–37)
BASOPHILS # BLD: 0 K/UL (ref 0–0.2)
BASOPHILS NFR BLD: 1 % (ref 0–2)
BILIRUB SERPL-MCNC: 1.3 MG/DL (ref 0.2–1.1)
BUN SERPL-MCNC: 24 MG/DL (ref 6–23)
CALCIUM SERPL-MCNC: 9.1 MG/DL (ref 8.3–10.4)
CHLORIDE SERPL-SCNC: 104 MMOL/L (ref 101–110)
CO2 SERPL-SCNC: 26 MMOL/L (ref 21–32)
CREAT SERPL-MCNC: 1.2 MG/DL (ref 0.8–1.5)
DIFFERENTIAL METHOD BLD: ABNORMAL
EOSINOPHIL # BLD: 0.7 K/UL (ref 0–0.8)
EOSINOPHIL NFR BLD: 13 % (ref 0.5–7.8)
ERYTHROCYTE [DISTWIDTH] IN BLOOD BY AUTOMATED COUNT: 14.1 % (ref 11.9–14.6)
GLOBULIN SER CALC-MCNC: 3.8 G/DL (ref 2.3–3.5)
GLUCOSE SERPL-MCNC: 99 MG/DL (ref 65–100)
HCG SERPL-ACNC: <1 MIU/ML (ref 0–2)
HCT VFR BLD AUTO: 49 %
HGB BLD-MCNC: 16.2 G/DL (ref 13.6–17.2)
IMM GRANULOCYTES # BLD AUTO: 0 K/UL (ref 0–0.5)
IMM GRANULOCYTES NFR BLD AUTO: 0 % (ref 0–5)
LDH SERPL L TO P-CCNC: 165 U/L (ref 100–190)
LYMPHOCYTES # BLD: 2 K/UL (ref 0.5–4.6)
LYMPHOCYTES NFR BLD: 35 % (ref 13–44)
MCH RBC QN AUTO: 27.1 PG (ref 26.1–32.9)
MCHC RBC AUTO-ENTMCNC: 33.1 G/DL (ref 31.4–35)
MCV RBC AUTO: 82.1 FL (ref 79.6–97.8)
MONOCYTES # BLD: 0.8 K/UL (ref 0.1–1.3)
MONOCYTES NFR BLD: 13 % (ref 4–12)
NEUTS SEG # BLD: 2.2 K/UL (ref 1.7–8.2)
NEUTS SEG NFR BLD: 38 % (ref 43–78)
NRBC # BLD: 0 K/UL (ref 0–0.2)
PLATELET # BLD AUTO: 299 K/UL (ref 150–450)
PMV BLD AUTO: 9.4 FL (ref 9.4–12.3)
POTASSIUM SERPL-SCNC: 4.1 MMOL/L (ref 3.5–5.1)
PROT SERPL-MCNC: 7.9 G/DL (ref 6.3–8.2)
RBC # BLD AUTO: 5.97 M/UL (ref 4.23–5.6)
SODIUM SERPL-SCNC: 136 MMOL/L (ref 136–145)
WBC # BLD AUTO: 5.7 K/UL (ref 4.3–11.1)

## 2022-10-07 PROCEDURE — 85025 COMPLETE CBC W/AUTO DIFF WBC: CPT

## 2022-10-07 PROCEDURE — 36415 COLL VENOUS BLD VENIPUNCTURE: CPT

## 2022-10-07 PROCEDURE — 80053 COMPREHEN METABOLIC PANEL: CPT

## 2022-10-07 PROCEDURE — 83615 LACTATE (LD) (LDH) ENZYME: CPT

## 2022-10-07 PROCEDURE — 84702 CHORIONIC GONADOTROPIN TEST: CPT

## 2022-10-07 PROCEDURE — 82105 ALPHA-FETOPROTEIN SERUM: CPT

## 2022-10-07 PROCEDURE — 99205 OFFICE O/P NEW HI 60 MIN: CPT | Performed by: INTERNAL MEDICINE

## 2022-10-07 RX ORDER — DEXTROAMPHETAMINE SACCHARATE, AMPHETAMINE ASPARTATE, DEXTROAMPHETAMINE SULFATE AND AMPHETAMINE SULFATE 3.75; 3.75; 3.75; 3.75 MG/1; MG/1; MG/1; MG/1
TABLET ORAL
COMMUNITY
Start: 2022-09-21

## 2022-10-07 ASSESSMENT — PATIENT HEALTH QUESTIONNAIRE - PHQ9
SUM OF ALL RESPONSES TO PHQ QUESTIONS 1-9: 0
SUM OF ALL RESPONSES TO PHQ QUESTIONS 1-9: 0
1. LITTLE INTEREST OR PLEASURE IN DOING THINGS: 0
2. FEELING DOWN, DEPRESSED OR HOPELESS: 0
SUM OF ALL RESPONSES TO PHQ QUESTIONS 1-9: 0
SUM OF ALL RESPONSES TO PHQ9 QUESTIONS 1 & 2: 0
SUM OF ALL RESPONSES TO PHQ QUESTIONS 1-9: 0

## 2022-10-07 NOTE — PATIENT INSTRUCTIONS
Patient Instructions from Today's Visit    Reason for Visit:  Initial medical oncology consult for testicular cancer, stage 1  9/22 right orchiectomy   Pathology showed - seminoma     Diagnosis Information:  https://www.[employer]/. net/about-us/asco-answers-patient-education-materials/xcqk-nhdmaji-xiru-sheets  Patient was educated and given handouts published by ASCO entitled ASCO Answers Fact Sheets about their diagnosis of testicular cancer during todays office visit. Plan:  Recommend close surveillance with labs and imaging - labs every 3 months and scans every 6 months for first year  No chemo is needed at this time   Labs today     Follow Up:  3 months with labs  6 months with labs and repeat scan     Recent Lab Results:  Office Visit on 10/05/2022   Component Date Value Ref Range Status    Glucose, Urine, POC 10/05/2022 Negative  Negative Final    Bilirubin, Urine, POC 10/05/2022 Negative  Negative Final    KETONES, Urine, POC 10/05/2022 Negative  Negative Final    Specific Gravity, Urine, POC 10/05/2022 1.025  1.001 - 1.035 Final    Blood (UA POC) 10/05/2022 Negative  Negative Final    pH, Urine, POC 10/05/2022 6.0  4.6 - 8.0 Final    Protein, Urine, POC 10/05/2022 Negative  Negative Final    Urobilinogen, POC 10/05/2022 2 mg/dL   Final    Nitrite, Urine, POC 10/05/2022 Negative  Negative Final    Leukocyte Esterase, Urine, POC 10/05/2022 Negative  Negative Final         Treatment Summary has been discussed and given to patient: no        -------------------------------------------------------------------------------------------------------------------  Please call our office at [phone] if you have any  of the following symptoms:   Fever of 100.5 or greater  Chills  Shortness of breath  Swelling or pain in one leg    After office hours an answering service is available and will contact a provider for emergencies or if you are experiencing any of the above symptoms.     Patient did express an interest in My Chart. My Chart log in information explained on the after visit summary printout at the . T[address] desk. Megan Cardenas RN, BSN  Nurse Navigator  [phone] cell  [email or web address]      ASCO ANSWERS is a collection of oncologist-approved patient education materials developed by the American Society of Clinical Oncology (ASCO) for people with cancer and their caregivers. [address] [facility],[address]. © 2004 AMERICAN SOCIETY OF CLINICAL ONCOLOGY. Testicular Cancer    What is testicular cancer? Testicular cancer begins when healthy cells in 1or both testicles change and grow out of control, tumor. Most testicular tumors develop in germ cells, which produce sperm. These tumors are called germ cell tumors and are divided into 2 types: seminoma or non-seminoma. A non-seminoma grows more quickly and is more likely to spread than a  seminoma, but both types need immediate treatment. What is the function of the testicles? The testicles are a part of a mans reproductive system. Each man has 2 testicles, and they are located under the penis in a sac-like pouch called the scrotum. The testicles make sperm and testosterone. Testosterone is a hormone that plays a role in the development of a mans reproductive organs and other characteristics. What does stage mean? The stage is a way of describing where the cancer is located, if or where it has spread, and whether it is affecting other parts of the body. There are 4 stages for testicular cancer: stages I through III (1 through 3) plus stage 0 (zero), called carcinoma in situ, a precancerous condition. Find more information about these stages at www.cancer. net/testicular. How is testicular cancer treated? The treatment of testicular cancer depends on the type of tumor (seminoma or non-seminoma), the stage, the amount of certain substances called serum tumor markers in the blood, and the mans overall health. Testicular cancer is almost always curable if found early and is often curable even at later stages. The 3 main treatment options are surgery, chemotherapy, and radiation therapy. Treatment usually starts with  surgery to remove the testicle with cancer. Your doctor may then recommend surveillance to closely monitor for any return of the disease. Some men may also have surgery to remove lymph nodes from the back of the abdomen. Chemotherapy may be given to lower the risk of the cancer returning or to treat cancer that has spread or come back after treatment. Surgery may be done after chemotherapy to remove any remaining tumors. Radiation therapy is used in specific situations. When making treatment decisions, men may also consider a clinical trial. Talk with your doctor about all treatment options and any concerns about how your treatment may affect your sexual functioning and fertility before treatment begins. The side effects of testicular cancer treatment can often be prevented or managed with the help of your health care team. This is called palliative care and is an important part of the overall treatment plan. How can I cope with testicular cancer? Absorbing the news of a cancer diagnosis and communicating with your health care team are key parts of the coping process. Seeking support, organizing your health information, making sure all of your questions are answered, and participating in the decision making process are other steps. Talk with your health care team about any concerns. Understanding your emotions and those of people close to you can be helpful in managing the diagnosis, treatment, and healing process. Questions to ask the health care team  Regular communication is important in making informed decisions about your health  care. Consider asking your health care team the following questions:   What type of testicular cancer do I have?    Can you explain my pathology report (laboratory test results) to me? What stage is the testicular cancer? What does this mean? Would you explain my treatment options? What clinical trials are available for me? Where are they located, and how do I find out  more about them? What treatment plan do you recommend? Why? What is the goal of each treatment? Is it to cure the cancer, prolong my life, or help me  feel better? Who will be part of my treatment team, and what does each member do? How will this treatment affect my daily life? Will I be able to work, exercise, and perform my usual activities? Could this treatment affect my sex life? If so, how and for how long? Will this treatment affect my ability to have children? Should I talk with a fertility specialist  about sperm banking before treatment begins? What long-term side effects may be associated with my cancer treatment? If Im worried about managing the costs of cancer care, who can help me? Where can I find emotional support for me and my family? Whom should I call with questions or problems? Is there anything else I should be asking?     WORDS TO KNOW  Benign: a tumor that in not cancerous    Biopsy: removal of a tissue sample that is then examined under a microscope to check for cancer cells    Chemotherapy: the use of drugs to destroy cancer cells    Lymph node: a tiny, bean-shaped organ that fights infection    Malignant: a tumor that is cancerous    Metastasis: the spread of cancer from where it began to another part of the body    Oncologist: a doctor that specializes in treating cancer    Prognosis: chance of recovery    Radiation therapy: the use of high-energy x-rays to destroy cancer cells    Radical inguinal orchiectomy: removal of a testicle through an incision in the groin    Retroperitoneal lymph node dissection: surgery to remove the lymph nodes from the back of the abdomen    Tumor: an abnormal growth of body tissue    Urologist: a doctor who specializes in treating conditions of the urinary tract  Find more questions to ask the health care team at [address] [facility]. [facility]/testicular. For a digital list of questions, download Cancer. Nets free mobile rajat at www.cancer. net/rajat. The ideas and opinions expressed here do not necessarily reflect the opinions of the American Society of Clinical Oncology (ASCO) or [employer]. The information in this fact sheet is not intended as medical or legal advice, or as a substitute for consultation with a physician or other licensed health care provider. Patients with health care-related questions should call or see their physician or other health care provider promptly and should not disregard professional medical advice, or delay seeking it, because of information encountered here. The mention of any product, service, or treatment in this fact sheet should not be construed as an ASCO endorsement. [facility] is not responsible for any injury or damage to persons or property arising out of or related to any use of ASCOs patient education materials, or to any errors or omissions. To order more printed copies, please call [phone] or visit www.cancer. net/kassie. [address]  [facility], [address] [place] [address], [address] [facility]  Toll Free: [phone]  Phone: [phone]  www. Terence Lefort. net  www.conquer. org  © 2017 American Society of Clinical Oncology.  For permissions information, contact [email or web address]  Benign:  A tumor that is not cancero

## 2022-10-07 NOTE — PROGRESS NOTES
10/7/22 saw pt today with Dr. Maxim Hernandez for initial medical oncology consult for testicular cancer. Pathology reviewed. Recommend close surveillance. He is recovering well from surgery. Denies any issues. PO intake is good. Labs today after visit. Provided opportunity to ask questions and all were discussed. Follow up in 3 months with labs and 6 months with labs and repeat scan. Encouraged to call with any concerns. Navigation will not sign on.

## 2022-10-07 NOTE — PROGRESS NOTES
[facility] Hematology and Oncology: Office Visit New Patient H & P    Chief Complaint:    Chief Complaint   Patient presents with    New Patient         History of Present Illness:  Mr. Mitch Pillai is a 36 y.o. male who presents today for evaluation regarding seminoma. He presented to [facility] on 9/8/22 reporting a several week history of right testicular and lower abdominal pain. He also reported that his right testis had become firmer over time. He had a scrotal ultrasound done at [address] on 8/24/22 which showed a diffusely abnormal right testis, suspicious for testicular malignancy. Labs drawn on 9/12/22 were significant for elevated beta HCG at 13. AFP was WNL at 1.30; PSA was WNL at 1.3, LD was WNL at 169; and testosterone was WNL at 418. CT of the chest, abdomen, and pelvis was performed on 9/16/22. Despite limited evaluation of testicular mass by CT, there was asymmetric increased attenuation of the right testis noted, which could reflect a testicular malignancy; no evidence of metastatic lymphadenopathy; a 3 mm right lower lobe lung nodule; and multiple non-obstructing right renal calculi with mild right hydroureteronephrosis and no visualized obstructing ureteral stone. On 9/22/22 patient underwent right radical orchiectomy with Dr. Jesus Keating. 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 His pathologic stage was T1b (due to size over 3cm), clinically T1N0M0. Mr. Mitch Pillai is recovering well postoperatively and is now referred to , per Urology, for Medical Oncology evaluation and further treatment     Review of Systems:  Constitutional: Negative. HENT: Negative. Eyes: Negative. Respiratory: Negative. Cardiovascular: Negative. Gastrointestinal: Negative. Genitourinary: Negative. Musculoskeletal: Negative. Skin: Negative. Neurological: Negative. Endo/Heme/Allergies: Negative. Psychiatric/Behavioral: Negative. All other systems reviewed and are negative.      No Known Allergies  Past Medical History:   Diagnosis Date    Hypercholesterolemia     high triglycerides    Hypertension     controlled with med    Sleep apnea     per pt-- hasnt used in last yr due to wt loss and pressures \"arent right\"    Testicle cancer ([facility] [address] 09/2022    right     Past Surgical History:   Procedure Laterality Date    TESTICLE REMOVAL Right 9/22/2022    ORCHIECTOMY RADICAL RIGHT performed by Carol Walker DO at [facility] MAIN OR    WISDOM TOOTH EXTRACTION      as teen     Family History   Problem Relation Age of Onset    Heart Disease Father     High Cholesterol Father     No Known Problems Brother      Social History     Socioeconomic History    Marital status: Single     Spouse name: Not on file    Number of children: Not on file    Years of education: Not on file    Highest education level: Not on file   Occupational History    Not on file   Tobacco Use    Smoking status: Some Days    Smokeless tobacco: Never    Tobacco comments:     Quit smoking: 3-4 x month cigars---- never smoked cigarettes   Vaping Use    Vaping Use: Never used   Substance and Sexual Activity    Alcohol use: Yes     Comment: occ    Drug use: No    Sexual activity: Not on file   Other Topics Concern    Not on file   Social History Narrative    caffeine intake -- 4 x week     Social Determinants of Health     Financial Resource Strain: Not on file   Food Insecurity: Not on file   Transportation Needs: Not on file   Physical Activity: Not on file   Stress: Not on file   Social Connections: Not on file   Intimate Partner Violence: Not on file   Housing Stability: Not on file     Current Outpatient Medications   Medication Sig Dispense Refill    amphetamine-dextroamphetamine (ADDERALL) 15 MG tablet       gemfibrozil (LOPID) 600 MG tablet Take 600 mg by mouth 2 times daily      Multiple Vitamin (MULTI-VITAMIN DAILY PO) Take 1 tablet by mouth daily      testosterone cypionate (DEPOTESTOTERONE CYPIONATE) 200 MG/ML injection Inject 1 mg into the muscle. Every 4 days      LISINOPRIL PO Take 40 mg by mouth daily      chlorthalidone (HYGROTON) 25 MG tablet Take by mouth daily       No current facility-administered medications for this visit. OBJECTIVE:  /84 (Site: Left Upper Arm, Position: Sitting, Cuff Size: Large Adult)   Pulse (!) 102   Temp 97.9 °F (36.6 °C) (Oral)   Resp 13   Ht 6' (1.829 m)   Wt 231 lb 12.8 oz (105.1 kg)   SpO2 97%   BMI 31.44 kg/m²     Physical Exam:  Constitutional: Well developed, well nourished male in no acute distress, sitting comfortably on the examination table. HEENT: Normocephalic and atraumatic. Sclerae anicteric. Neck supple without JVD. No thyromegaly present. Lymph node   No palpable submandibular, cervical, supraclavicular, axillary or inguinal lymph nodes. Skin Warm and dry. No bruising and no rash noted. No erythema. No pallor. Respiratory Lungs are clear to auscultation bilaterally without wheezes, rales or rhonchi, normal air exchange without accessory muscle use. CVS Normal rate, regular rhythm and normal S1 and S2. No murmurs, gallops, or rubs. Neuro Grossly nonfocal with no obvious sensory or motor deficits. MSK Normal range of motion in general.  No edema and no tenderness. Psych Appropriate mood and affect. Labs:  No results found for this or any previous visit (from the past 24 hour(s)). Imaging:  CT CHEST ABDOMEN PELVIS W CONTRAST    Result Date: 9/16/2022  EXAM: CT CHEST, ABDOMEN, AND PELVIS WITH CONTRAST INDICATION: Testicular mass. COMPARISON: None available TECHNIQUE:  CT imaging was performed of the chest, abdomen, and pelvis after intravenous injection of 100 mL Isovue 370. Intravenous contrast was used for better evaluation of solid organs and vascular structures.  Oral contrast was used for bowel opacification. Coronal reformatted imaging provided. Radiation dose reduction techniques were used for this study. Our CT scanners use one or all of the following: Automated exposure control, adjustment of the mA and/or kV according to patient size, iterative reconstruction. FINDINGS: CHEST: Mediastinum and visualized thyroid: Normal. Heart: Normal. Large Vessels: Normal. Pleura: Normal. Lungs: Calcified right upper lobe lung nodules. 3 mm posterior right lower lobe lung nodule. Airways: Normal. ABDOMEN AND PELVIS: Liver: Normal in size and morphology. No focal lesions. Gallbladder/biliary: Normal gallbladder. No biliary dilatation. Pancreas: Normal. Spleen: Normal. Adrenals: Normal. Kidneys: Nonobstructing right renal calculi. Mild right hydroureteronephrosis. No visualized stone within the ureter. Bladder: Normal. Pelvic organs: The testicles are not well evaluated by CT, however, there is increased attenuation of the right testis. Gastrointestinal: Normal. Peritoneum/retroperitoneum: Normal. Lymph nodes: Normal. Vessels: Incidental retroaortic left renal vein. Bones/Soft tissues: Normal.     1.  Limited evaluation of testicular mass by CT, however there is asymmetric increased attenuation of the right testis, which could reflect a testicular malignancy. 2.  No evidence of metastatic lymphadenopathy. 3.  3 mm right lower lobe lung nodule. 4.  Multiple nonobstructing right renal calculi. Mild right hydroureteronephrosis. No visualized obstructing ureteral stone. Pathology:  DIAGNOSIS        \"RIGHT TESTICLE AND SPERMATIC CORD\":  SEMINOMA, CONFINED TO TESTICLE,     4.5 CM IN GREATEST DIMENSION, MARGINS UNINVOLVED.                Microscopic Description   Surgical Pathology Cancer Case Summary   CLINICAL   SPECIMEN        SPECIMEN LATERALITY:   Right   TUMOR        HISTOLOGIC TYPE:          Seminoma        TUMOR SIZE:   4.5 cm   TUMOR FOCALITY:   Unifocal   TUMOR EXTENSION:   Tumor limited to testis LYMPHOVASCULAR INVASION:   Not identified   MARGINS        SPERMATIC CORD MARGIN:   Uninvolved by tumor   LYMPH NODES        REGIONAL LYMPH NODES:   No lymph nodes submitted or found   PATHOLOGIC STAGE CLASSIFICATION (pTNM, AJCC 8th Edition)        PRIMARY TUMOR (pT):   pT1b   REGIONAL LYMPH NODES (pN):   pN0           ASSESSMENT:   Diagnosis Orders   1. Seminoma of right testis, stage 1 (HCC)  CBC with Auto Differential    Comprehensive Metabolic Panel    AFP Tumor Marker    HCG, Quantitative, Pregnancy    Lactate Dehydrogenase    CBC with Auto Differential    Comprehensive Metabolic Panel    AFP Tumor Marker    HCG, Quantitative, Pregnancy    Lactate Dehydrogenase        Patient Active Problem List   Diagnosis    Hypersomnia due to medical condition    Decreased libido    Low testosterone    Obesity (BMI 30.0-34. 9)    Sleep apnea    Hypertriglyceridemia    Testicular mass           PLAN:  Lab studies and imaging studies were personally reviewed. Pertinent old records were reviewed from PGU. Testicular cancer: pure seminoma, 4.5 cm, negative margins, mild beta-hCG elevation pre-op, CT negative except 3mm RLL lung nodule, no lymphadenopathy. I discussed the pathophysiology and natural history of GCT with Mr. Vicente Esposito. He is stage IA (assuming his post-op beta hCG returns to normal), so he is appropriate for observation, which is his preference as well. We discussed the follow-up plan which will be every 3 month TM/OV and 6 month CT the first year, we will include chest imaging due to the pulmonary nodule. After year 1 assuming he remains in CR we will decrease to 6 month intervals per NCCN guidelines. He is in agreement to proceed. All questions were asked and answered to the best of my ability. F/u in 3 months for labs and clinical recheck.             Rodri Robledo MD, MD  [facility] Hematology and Oncology  [address] [place] [address]  Office : [phone]  Fax : [phone]

## 2023-01-06 ENCOUNTER — OFFICE VISIT (OUTPATIENT)
Dept: ONCOLOGY | Age: 43
End: 2023-01-06
Payer: COMMERCIAL

## 2023-01-06 ENCOUNTER — HOSPITAL ENCOUNTER (OUTPATIENT)
Dept: LAB | Age: 43
Discharge: HOME | End: 2023-01-06
Payer: COMMERCIAL

## 2023-01-06 VITALS
BODY MASS INDEX: 31.77 KG/M2 | HEART RATE: 112 BPM | HEIGHT: 72 IN | RESPIRATION RATE: 18 BRPM | TEMPERATURE: 97.8 F | OXYGEN SATURATION: 98 % | SYSTOLIC BLOOD PRESSURE: 118 MMHG | WEIGHT: 234.6 LBS | DIASTOLIC BLOOD PRESSURE: 84 MMHG

## 2023-01-06 DIAGNOSIS — C62.91 SEMINOMA OF RIGHT TESTIS, STAGE 1 (HCC): ICD-10-CM

## 2023-01-06 DIAGNOSIS — C62.91 SEMINOMA OF RIGHT TESTIS, STAGE 1 (HCC): Primary | ICD-10-CM

## 2023-01-06 LAB
AFP-TM SERPL-MCNC: 1.3 NG/ML
ALBUMIN SERPL-MCNC: 4.2 G/DL (ref 3.5–5)
ALBUMIN/GLOB SERPL: 1.1 (ref 0.4–1.6)
ALP SERPL-CCNC: 49 U/L (ref 50–136)
ALT SERPL-CCNC: 48 U/L (ref 12–65)
ANION GAP SERPL CALC-SCNC: 4 MMOL/L (ref 2–11)
AST SERPL-CCNC: 26 U/L (ref 15–37)
BASOPHILS # BLD: 0.1 K/UL (ref 0–0.2)
BASOPHILS NFR BLD: 1 % (ref 0–2)
BILIRUB SERPL-MCNC: 1.2 MG/DL (ref 0.2–1.1)
BUN SERPL-MCNC: 24 MG/DL (ref 6–23)
CALCIUM SERPL-MCNC: 8.9 MG/DL (ref 8.3–10.4)
CHLORIDE SERPL-SCNC: 103 MMOL/L (ref 101–110)
CO2 SERPL-SCNC: 28 MMOL/L (ref 21–32)
CREAT SERPL-MCNC: 1.2 MG/DL (ref 0.8–1.5)
DIFFERENTIAL METHOD BLD: ABNORMAL
EOSINOPHIL # BLD: 0.3 K/UL (ref 0–0.8)
EOSINOPHIL NFR BLD: 4 % (ref 0.5–7.8)
ERYTHROCYTE [DISTWIDTH] IN BLOOD BY AUTOMATED COUNT: 14.8 % (ref 11.9–14.6)
GLOBULIN SER CALC-MCNC: 3.9 G/DL (ref 2.8–4.5)
GLUCOSE SERPL-MCNC: 87 MG/DL (ref 65–100)
HCG SERPL-ACNC: <1 MIU/ML (ref 0–2)
HCT VFR BLD AUTO: 52.4 %
HGB BLD-MCNC: 17.4 G/DL (ref 13.6–17.2)
IMM GRANULOCYTES # BLD AUTO: 0 K/UL (ref 0–0.5)
IMM GRANULOCYTES NFR BLD AUTO: 0 % (ref 0–5)
LDH SERPL L TO P-CCNC: 157 U/L (ref 100–190)
LYMPHOCYTES # BLD: 2.3 K/UL (ref 0.5–4.6)
LYMPHOCYTES NFR BLD: 37 % (ref 13–44)
MCH RBC QN AUTO: 28.1 PG (ref 26.1–32.9)
MCHC RBC AUTO-ENTMCNC: 33.2 G/DL (ref 31.4–35)
MCV RBC AUTO: 84.5 FL (ref 82–102)
MONOCYTES # BLD: 1 K/UL (ref 0.1–1.3)
MONOCYTES NFR BLD: 16 % (ref 4–12)
NEUTS SEG # BLD: 2.6 K/UL (ref 1.7–8.2)
NEUTS SEG NFR BLD: 42 % (ref 43–78)
NRBC # BLD: 0 K/UL (ref 0–0.2)
PLATELET # BLD AUTO: 246 K/UL (ref 150–450)
PMV BLD AUTO: 9.6 FL (ref 9.4–12.3)
POTASSIUM SERPL-SCNC: 4 MMOL/L (ref 3.5–5.1)
PROT SERPL-MCNC: 8.1 G/DL (ref 6.3–8.2)
RBC # BLD AUTO: 6.2 M/UL (ref 4.23–5.6)
SODIUM SERPL-SCNC: 135 MMOL/L (ref 133–143)
WBC # BLD AUTO: 6.3 K/UL (ref 4.3–11.1)

## 2023-01-06 PROCEDURE — 84702 CHORIONIC GONADOTROPIN TEST: CPT

## 2023-01-06 PROCEDURE — 80053 COMPREHEN METABOLIC PANEL: CPT

## 2023-01-06 PROCEDURE — 36415 COLL VENOUS BLD VENIPUNCTURE: CPT

## 2023-01-06 PROCEDURE — 83615 LACTATE (LD) (LDH) ENZYME: CPT

## 2023-01-06 PROCEDURE — 85025 COMPLETE CBC W/AUTO DIFF WBC: CPT

## 2023-01-06 PROCEDURE — 99213 OFFICE O/P EST LOW 20 MIN: CPT | Performed by: INTERNAL MEDICINE

## 2023-01-06 PROCEDURE — 82105 ALPHA-FETOPROTEIN SERUM: CPT

## 2023-01-06 ASSESSMENT — PATIENT HEALTH QUESTIONNAIRE - PHQ9
1. LITTLE INTEREST OR PLEASURE IN DOING THINGS: 0
SUM OF ALL RESPONSES TO PHQ QUESTIONS 1-9: 0
SUM OF ALL RESPONSES TO PHQ QUESTIONS 1-9: 0
SUM OF ALL RESPONSES TO PHQ9 QUESTIONS 1 & 2: 0
2. FEELING DOWN, DEPRESSED OR HOPELESS: 0
SUM OF ALL RESPONSES TO PHQ QUESTIONS 1-9: 0
SUM OF ALL RESPONSES TO PHQ QUESTIONS 1-9: 0

## 2023-01-06 NOTE — PROGRESS NOTES
[facility] Hematology and Oncology: Office Visit Established Patient    Chief Complaint:    Chief Complaint   Patient presents with    Follow-up         History of Present Illness:  Mr. Hari Rausch is a 36 y.o. male who presents today for follow-up regarding seminoma. He presented to [facility] on 9/8/22 reporting a several week history of right testicular and lower abdominal pain. He also reported that his right testis had become firmer over time. He had a scrotal ultrasound done at [address] on 8/24/22 which showed a diffusely abnormal right testis, suspicious for testicular malignancy. Labs drawn on 9/12/22 were significant for elevated beta HCG at 13. AFP was WNL at 1.30; PSA was WNL at 1.3, LD was WNL at 169; and testosterone was WNL at 418. CT of the chest, abdomen, and pelvis was performed on 9/16/22. Despite limited evaluation of testicular mass by CT, there was asymmetric increased attenuation of the right testis noted, which could reflect a testicular malignancy; no evidence of metastatic lymphadenopathy; a 3 mm right lower lobe lung nodule; and multiple non-obstructing right renal calculi with mild right hydroureteronephrosis and no visualized obstructing ureteral stone. On 9/22/22 patient underwent right radical orchiectomy with Dr. Cliff Brown. 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 His pathologic stage was T1b (due to size over 3cm), clinically T1N0M0. We recommended observation due to the stage IA disease. He returns for follow-up. No complaints. Review of Systems:  Constitutional: Negative. HENT: Negative. Eyes: Negative. Respiratory: Negative. Cardiovascular: Negative. Gastrointestinal: Negative. Genitourinary: Negative. Musculoskeletal: Negative. Skin: Negative. Neurological: Negative. Endo/Heme/Allergies: Negative. Psychiatric/Behavioral: Negative. All other systems reviewed and are negative.      No Known Allergies  Past Medical History:   Diagnosis Date    Hypercholesterolemia     high triglycerides    Hypertension     controlled with med    Sleep apnea     per pt-- hasnt used in last yr due to wt loss and pressures \"arent right\"    Testicle cancer ([facility] [address] 09/2022    right     Past Surgical History:   Procedure Laterality Date    TESTICLE REMOVAL Right 9/22/2022    ORCHIECTOMY RADICAL RIGHT performed by Brayan Coleman DO at [facility] MAIN OR    WISDOM TOOTH EXTRACTION      as teen     Family History   Problem Relation Age of Onset    Heart Disease Father     High Cholesterol Father     No Known Problems Brother      Social History     Socioeconomic History    Marital status: Single     Spouse name: Not on file    Number of children: Not on file    Years of education: Not on file    Highest education level: Not on file   Occupational History    Not on file   Tobacco Use    Smoking status: Some Days    Smokeless tobacco: Never    Tobacco comments:     Quit smoking: 3-4 x month cigars---- never smoked cigarettes   Vaping Use    Vaping Use: Never used   Substance and Sexual Activity    Alcohol use: Yes     Comment: occ    Drug use: No    Sexual activity: Not on file   Other Topics Concern    Not on file   Social History Narrative    caffeine intake -- 4 x week     Social Determinants of Health     Financial Resource Strain: Not on file   Food Insecurity: Not on file   Transportation Needs: Not on file   Physical Activity: Not on file   Stress: Not on file   Social Connections: Not on file   Intimate Partner Violence: Not on file   Housing Stability: Not on file     Current Outpatient Medications   Medication Sig Dispense Refill    amphetamine-dextroamphetamine (ADDERALL) 15 MG tablet       gemfibrozil (LOPID) 600 MG tablet Take 600 mg by mouth 2 times daily      Multiple Vitamin (MULTI-VITAMIN DAILY PO) Take 1 tablet by mouth daily      testosterone cypionate (DEPOTESTOTERONE CYPIONATE) 200 MG/ML injection Inject 1 mg into the muscle. Every 4 days      LISINOPRIL PO Take 40 mg by mouth daily      chlorthalidone (HYGROTON) 25 MG tablet Take by mouth daily       No current facility-administered medications for this visit. OBJECTIVE:  /84 (Site: Right Upper Arm, Position: Standing)   Pulse (!) 112   Temp 97.8 °F (36.6 °C)   Resp 18   Ht 6' (1.829 m)   Wt 234 lb 9.6 oz (106.4 kg)   SpO2 98%   BMI 31.82 kg/m²     Physical Exam:  Constitutional: Well developed, well nourished male in no acute distress, sitting comfortably on the examination table. HEENT: Normocephalic and atraumatic. Sclerae anicteric. Neck supple without JVD. No thyromegaly present. Lymph node   No palpable submandibular, cervical, supraclavicular lymph nodes. Skin Warm and dry. No bruising and no rash noted. No erythema. No pallor. Respiratory Lungs are clear to auscultation bilaterally without wheezes, rales or rhonchi, normal air exchange without accessory muscle use. CVS Normal rate, regular rhythm and normal S1 and S2. No murmurs, gallops, or rubs. Neuro Grossly nonfocal with no obvious sensory or motor deficits. MSK Normal range of motion in general.  No edema and no tenderness. Psych Appropriate mood and affect.       Labs:  Recent Results (from the past 24 hour(s))   CBC with Auto Differential    Collection Time: 01/06/23 10:45 AM   Result Value Ref Range    WBC 6.3 4.3 - 11.1 K/uL    RBC 6.20 (H) 4.23 - 5.6 M/uL    Hemoglobin 17.4 (H) 13.6 - 17.2 g/dL    Hematocrit 52.4 %    MCV 84.5 82.0 - 102.0 FL    MCH 28.1 26.1 - 32.9 PG    MCHC 33.2 31.4 - 35.0 g/dL    RDW 14.8 (H) 11.9 - 14.6 %    Platelets 882 030 - 525 K/uL    MPV 9.6 9.4 - 12.3 FL    nRBC 0.00 0.0 - 0.2 K/uL    Seg Neutrophils 42 (L) 43 - 78 %    Lymphocytes 37 13 - 44 %    Monocytes 16 (H) 4.0 - 12.0 %    Eosinophils % 4 0.5 - 7.8 %    Basophils 1 0.0 - 2.0 % Immature Granulocytes 0 0.0 - 5.0 %    Segs Absolute 2.6 1.7 - 8.2 K/UL    Absolute Lymph # 2.3 0.5 - 4.6 K/UL    Absolute Mono # 1.0 0.1 - 1.3 K/UL    Absolute Eos # 0.3 0.0 - 0.8 K/UL    Basophils Absolute 0.1 0.0 - 0.2 K/UL    Absolute Immature Granulocyte 0.0 0.0 - 0.5 K/UL    Differential Type AUTOMATED     Comprehensive Metabolic Panel    Collection Time: 01/06/23 10:45 AM   Result Value Ref Range    Sodium 135 133 - 143 mmol/L    Potassium 4.0 3.5 - 5.1 mmol/L    Chloride 103 101 - 110 mmol/L    CO2 28 21 - 32 mmol/L    Anion Gap 4 2 - 11 mmol/L    Glucose 87 65 - 100 mg/dL    BUN 24 (H) 6 - 23 MG/DL    Creatinine 1.20 0.8 - 1.5 MG/DL    Est, Glom Filt Rate >60 >60 ml/min/1.73m2    Calcium 8.9 8.3 - 10.4 MG/DL    Total Bilirubin 1.2 (H) 0.2 - 1.1 MG/DL    ALT 48 12 - 65 U/L    AST 26 15 - 37 U/L    Alk Phosphatase 49 (L) 50 - 136 U/L    Total Protein 8.1 6.3 - 8.2 g/dL    Albumin 4.2 3.5 - 5.0 g/dL    Globulin 3.9 2.8 - 4.5 g/dL    Albumin/Globulin Ratio 1.1 0.4 - 1.6     AFP Tumor Marker    Collection Time: 01/06/23 10:45 AM   Result Value Ref Range    AFP-Tumor Marker 1.30 <8.0 ng/mL   HCG, Quantitative, Pregnancy    Collection Time: 01/06/23 10:45 AM   Result Value Ref Range    hCG Quant <1 0.0 - 2.0 MIU/ML   Lactate Dehydrogenase    Collection Time: 01/06/23 10:45 AM   Result Value Ref Range     100 - 190 U/L       Imaging:  CT CHEST ABDOMEN PELVIS W CONTRAST    Result Date: 9/16/2022  EXAM: CT CHEST, ABDOMEN, AND PELVIS WITH CONTRAST INDICATION: Testicular mass. COMPARISON: None available TECHNIQUE:  CT imaging was performed of the chest, abdomen, and pelvis after intravenous injection of 100 mL Isovue 370. Intravenous contrast was used for better evaluation of solid organs and vascular structures. Oral contrast was used for bowel opacification. Coronal reformatted imaging provided. Radiation dose reduction techniques were used for this study.  Our CT scanners use one or all of the following: Automated exposure control, adjustment of the mA and/or kV according to patient size, iterative reconstruction. FINDINGS: CHEST: Mediastinum and visualized thyroid: Normal. Heart: Normal. Large Vessels: Normal. Pleura: Normal. Lungs: Calcified right upper lobe lung nodules. 3 mm posterior right lower lobe lung nodule. Airways: Normal. ABDOMEN AND PELVIS: Liver: Normal in size and morphology. No focal lesions. Gallbladder/biliary: Normal gallbladder. No biliary dilatation. Pancreas: Normal. Spleen: Normal. Adrenals: Normal. Kidneys: Nonobstructing right renal calculi. Mild right hydroureteronephrosis. No visualized stone within the ureter. Bladder: Normal. Pelvic organs: The testicles are not well evaluated by CT, however, there is increased attenuation of the right testis. Gastrointestinal: Normal. Peritoneum/retroperitoneum: Normal. Lymph nodes: Normal. Vessels: Incidental retroaortic left renal vein. Bones/Soft tissues: Normal.     1.  Limited evaluation of testicular mass by CT, however there is asymmetric increased attenuation of the right testis, which could reflect a testicular malignancy. 2.  No evidence of metastatic lymphadenopathy. 3.  3 mm right lower lobe lung nodule. 4.  Multiple nonobstructing right renal calculi. Mild right hydroureteronephrosis. No visualized obstructing ureteral stone. Pathology:  DIAGNOSIS        \"RIGHT TESTICLE AND SPERMATIC CORD\":  SEMINOMA, CONFINED TO TESTICLE,     4.5 CM IN GREATEST DIMENSION, MARGINS UNINVOLVED.                Microscopic Description   Surgical Pathology Cancer Case Summary   CLINICAL   SPECIMEN        SPECIMEN LATERALITY:   Right   TUMOR        HISTOLOGIC TYPE:          Seminoma        TUMOR SIZE:   4.5 cm   TUMOR FOCALITY:   Unifocal   TUMOR EXTENSION:   Tumor limited to testis   LYMPHOVASCULAR INVASION:   Not identified   MARGINS        SPERMATIC CORD MARGIN:   Uninvolved by tumor   LYMPH NODES        REGIONAL LYMPH NODES:   No lymph nodes submitted or found   PATHOLOGIC STAGE CLASSIFICATION (pTNM, AJCC 8th Edition)        PRIMARY TUMOR (pT):   pT1b   REGIONAL LYMPH NODES (pN):   pN0           ASSESSMENT:   Diagnosis Orders   1. Seminoma of right testis, stage 1 (HCC)  CT CHEST ABDOMEN PELVIS W CONTRAST Additional Contrast? Oral          Patient Active Problem List   Diagnosis    Hypersomnia due to medical condition    Decreased libido    Low testosterone    Obesity (BMI 30.0-34. 9)    Sleep apnea    Hypertriglyceridemia    Testicular mass    Seminoma of right testis, stage 1 (HCC)           PLAN:  Lab studies were personally reviewed. Testicular cancer: pure seminoma, 4.5 cm, negative margins, mild beta-hCG elevation pre-op, CT negative except 3mm RLL lung nodule, no lymphadenopathy. I discussed the pathophysiology and natural history of GCT with . Agustin Alvarez. He is stage IA (assuming his post-op beta hCG returns to normal), so he is appropriate for observation, which is his preference as well. We discussed the follow-up plan which will be every 3 month TM/OV and 6 month CT the first year, we will include chest imaging due to the pulmonary nodule. After year 1 assuming he remains in CR we will decrease to 6 month intervals per NCCN guidelines. He returns for follow-up. No complaints. Labs reviewed, tumor markers normal including LD/beta-hCG/AFP. Continue observation with repeat labs/TM/OV and CT in 3 months per NCCN guidelines. He will call with any alarm symptoms in the meantime. All questions were asked and answered to the best of my ability.            Frank Llanes MD, MD  [facility] Hematology and Oncology  [address] [place] [address]  Office : [phone]  Fax : [phone]

## 2023-01-06 NOTE — PATIENT INSTRUCTIONS
Patient Instructions from Today's Visit    Reason for Visit:  Follow up Seminoma    Diagnosis Information:  [email or web address] net/about-us/[email or web address]-answers-patient-education-materials/vaui-wgyynhe-[email or web address]-sheets    Plan:  Lab results reviewed  CT in 3 months before next visit      Follow Up:   Follow up in 3 months with labs prior    Recent Lab Results:  Hospital Outpatient Visit on 01/06/2023   Component Date Value Ref Range Status    WBC 01/06/2023 6.3  4.3 - 11.1 K/uL Final    RBC 01/06/2023 6.20 (A)  4.23 - 5.6 M/uL Final    Hemoglobin 01/06/2023 17.4 (A)  13.6 - 17.2 g/dL Final    Hematocrit 01/06/2023 52.4  % Final    MCV 01/06/2023 84.5  82.0 - 102.0 FL Final    MCH 01/06/2023 28.1  26.1 - 32.9 PG Final    MCHC 01/06/2023 33.2  31.4 - 35.0 g/dL Final    RDW 01/06/2023 14.8 (A)  11.9 - 14.6 % Final    Platelets 69/16/4558 246  150 - 450 K/uL Final    MPV 01/06/2023 9.6  9.4 - 12.3 FL Final    nRBC 01/06/2023 0.00  0.0 - 0.2 K/uL Final    **Note: Absolute NRBC parameter is now reported with Hemogram**    Seg Neutrophils 01/06/2023 42 (A)  43 - 78 % Final    Lymphocytes 01/06/2023 37  13 - 44 % Final    Monocytes 01/06/2023 16 (A)  4.0 - 12.0 % Final    Eosinophils % 01/06/2023 4  0.5 - 7.8 % Final    Basophils 01/06/2023 1  0.0 - 2.0 % Final    Immature Granulocytes 01/06/2023 0  0.0 - 5.0 % Final    Segs Absolute 01/06/2023 2.6  1.7 - 8.2 K/UL Final    Absolute Lymph # 01/06/2023 2.3  0.5 - 4.6 K/UL Final    Absolute Mono # 01/06/2023 1.0  0.1 - 1.3 K/UL Final    Absolute Eos # 01/06/2023 0.3  0.0 - 0.8 K/UL Final    Basophils Absolute 01/06/2023 0.1  0.0 - 0.2 K/UL Final    Absolute Immature Granulocyte 01/06/2023 0.0  0.0 - 0.5 K/UL Final    Differential Type 01/06/2023 AUTOMATED    Final    Sodium 01/06/2023 135  133 - 143 mmol/L Final    Potassium 01/06/2023 4.0  3.5 - 5.1 mmol/L Final    Chloride 01/06/2023 103  101 - 110 mmol/L Final    CO2 01/06/2023 28  21 - 32 mmol/L Final    Anion Gap 01/06/2023 4  2 - 11 mmol/L Final    Glucose 01/06/2023 87  65 - 100 mg/dL Final    BUN 01/06/2023 24 (A)  6 - 23 MG/DL Final    Creatinine 01/06/2023 1.20  0.8 - 1.5 MG/DL Final    Est, Glom Filt Rate 01/06/2023 >60  >60 ml/min/1.73m2 Final    Comment:      Pediatric calculator link: [employer].at. org/professionals/kdoqi/gfr_calculatorped       These results are not intended for use in patients <25years of age. eGFR results are calculated without a race factor using  the 2021 CKD-EPI equation. Careful clinical correlation is recommended, particularly when comparing to results calculated using previous equations. The CKD-EPI equation is less accurate in patients with extremes of muscle mass, extra-renal metabolism of creatinine, excessive creatine ingestion, or following therapy that affects renal tubular secretion. Calcium 01/06/2023 8.9  8.3 - 10.4 MG/DL Final    Total Bilirubin 01/06/2023 1.2 (A)  0.2 - 1.1 MG/DL Final    ALT 01/06/2023 48  12 - 65 U/L Final    AST 01/06/2023 26  15 - 37 U/L Final    Alk Phosphatase 01/06/2023 49 (A)  50 - 136 U/L Final    Total Protein 01/06/2023 8.1  6.3 - 8.2 g/dL Final    Albumin 01/06/2023 4.2  3.5 - 5.0 g/dL Final    Globulin 01/06/2023 3.9  2.8 - 4.5 g/dL Final    Albumin/Globulin Ratio 01/06/2023 1.1  0.4 - 1.6   Final    AFP-Tumor Marker 01/06/2023 1.30  <8.0 ng/mL Final    [employer]. Patient's results of tumor marker testing may not be comparable to labs using different manufacturers/methods.     hCG Quant 01/06/2023 <1  0.0 - 2.0 MIU/ML Final    Comment: Gestational Age 0.2-1 Week 5-50        mIU/mL  Gestational Age 1-2 Weeks        mIU/mL  Gestational Age 2-3 Weeks  100-5000     mIU/mL  Gestational Age 3-4 Weeks  500-55304    mIU/mL  Gestational Age 4-5 Weeks  1000-77988   mIU/mL  Gestational Age 5-6 Weeks  94609-368935 mIU/ml  Gestational Age 6-8 Weeks  95096-445203 mIU/ml  Gestational Age 2-3 Months 60327-025249 mIU/ml  If this is for miscarriage diagnosis it is recommended that repeat testing be done at this facility. LD 01/06/2023 157  100 - 190 U/L Final         Treatment Summary has been discussed and given to patient: n/a        -------------------------------------------------------------------------------------------------------------------  Please call our office at [phone] if you have any  of the following symptoms:   Fever of 100.5 or greater  Chills  Shortness of breath  Swelling or pain in one leg    After office hours an answering service is available and will contact a provider for emergencies or if you are experiencing any of the above symptoms. Patient does express an interest in My Chart. My Chart log in information explained on the after visit summary printout at the . [facility] [address] desk.     Rachel Albarado RN

## 2023-01-12 ENCOUNTER — OFFICE VISIT (OUTPATIENT)
Dept: UROLOGY | Age: 43
End: 2023-01-12
Payer: COMMERCIAL

## 2023-01-12 DIAGNOSIS — Z30.09 VASECTOMY EVALUATION: ICD-10-CM

## 2023-01-12 DIAGNOSIS — C62.91 MALIGNANT NEOPLASM OF RIGHT TESTIS, UNSPECIFIED WHETHER DESCENDED OR UNDESCENDED (HCC): Primary | ICD-10-CM

## 2023-01-12 LAB
BILIRUBIN, URINE, POC: NEGATIVE
BLOOD URINE, POC: NEGATIVE
GLUCOSE URINE, POC: NEGATIVE
KETONES, URINE, POC: NEGATIVE
LEUKOCYTE ESTERASE, URINE, POC: NEGATIVE
NITRITE, URINE, POC: NEGATIVE
PH, URINE, POC: 7 (ref 4.6–8)
PROTEIN,URINE, POC: NEGATIVE
SPECIFIC GRAVITY, URINE, POC: 1.02 (ref 1–1.03)
URINALYSIS CLARITY, POC: ABNORMAL
URINALYSIS COLOR, POC: ABNORMAL
UROBILINOGEN, POC: ABNORMAL

## 2023-01-12 PROCEDURE — 81003 URINALYSIS AUTO W/O SCOPE: CPT | Performed by: UROLOGY

## 2023-01-12 PROCEDURE — 99213 OFFICE O/P EST LOW 20 MIN: CPT | Performed by: UROLOGY

## 2023-01-12 RX ORDER — DIAZEPAM 10 MG/1
10 TABLET ORAL ONCE
Qty: 1 TABLET | Refills: 0 | Status: SHIPPED | OUTPATIENT
Start: 2023-01-12 | End: 2023-01-12

## 2023-01-12 NOTE — PROGRESS NOTES
[facility]  [place], [address]  [phone]    Rhianna Dobbs  : 1980     HPI   36 y.o., male returns in follow up for CaT. S/P R radical orchiectomy on 22. Path showed T1b seminoma. Followed by Dr. Vanessa Ortega (surveillance). CT on 22 showed a 3mm RLL pulmonary nodule with multiple small renal stones. Markers neg on 23. Doing well. Interested in vasectomy. One child. Past Medical History:   Diagnosis Date    Hypercholesterolemia     high triglycerides    Hypertension     controlled with med    Sleep apnea     per pt-- hasnt used in last yr due to wt loss and pressures \"arent right\"    Testicle cancer ([facility] [address] 2022    right     Past Surgical History:   Procedure Laterality Date    TESTICLE REMOVAL Right 2022    ORCHIECTOMY RADICAL RIGHT performed by Russel Petersen DO at [facility] MAIN OR    WISDOM TOOTH EXTRACTION      as teen     Current Outpatient Medications   Medication Sig Dispense Refill    amphetamine-dextroamphetamine (ADDERALL) 15 MG tablet       gemfibrozil (LOPID) 600 MG tablet Take 600 mg by mouth 2 times daily      Multiple Vitamin (MULTI-VITAMIN DAILY PO) Take 1 tablet by mouth daily      testosterone cypionate (DEPOTESTOTERONE CYPIONATE) 200 MG/ML injection Inject 1 mg into the muscle. Every 4 days      LISINOPRIL PO Take 40 mg by mouth daily      chlorthalidone (HYGROTON) 25 MG tablet Take by mouth daily       No current facility-administered medications for this visit.      No Known Allergies  Social History     Socioeconomic History    Marital status: Single     Spouse name: Not on file    Number of children: Not on file    Years of education: Not on file    Highest education level: Not on file   Occupational History    Not on file   Tobacco Use    Smoking status: Some Days    Smokeless tobacco: Never    Tobacco comments:     Quit smoking: 3-4 x month cigars---- never smoked cigarettes   Vaping Use    Vaping Use: Never used Substance and Sexual Activity    Alcohol use: Yes     Comment: occ    Drug use: No    Sexual activity: Not on file   Other Topics Concern    Not on file   Social History Narrative    caffeine intake -- 4 x week     Social Determinants of Health     Financial Resource Strain: Not on file   Food Insecurity: Not on file   Transportation Needs: Not on file   Physical Activity: Not on file   Stress: Not on file   Social Connections: Not on file   Intimate Partner Violence: Not on file   Housing Stability: Not on file     Family History   Problem Relation Age of Onset    Heart Disease Father     High Cholesterol Father     No Known Problems Brother        Review of Systems  All systems reviewed and are negative at this time. Physical Exam  There were no vitals taken for this visit.   General appearance - alert, well appearing, and in no distress  Mental status - alert, oriented to person, place, and time  Eyes - extraocular eye movements intact, sclera anicteric  Abdomen - soft, nontender, nondistended, no masses or organomegaly  Neurological -  normal speech, no focal findings or movement disorder noted  Skin - normal coloration and turgor      Urinalysis  UA - Dipstick  Results for orders placed or performed in visit on 01/12/23   AMB POC URINALYSIS DIP STICK AUTO W/O MICRO   Result Value Ref Range    Color (UA POC)      Clarity (UA POC)      Glucose, Urine, POC Negative Negative    Bilirubin, Urine, POC Negative Negative    KETONES, Urine, POC Negative Negative    Specific Gravity, Urine, POC 1.025 1.001 - 1.035    Blood (UA POC) Negative Negative    pH, Urine, POC 7.0 4.6 - 8.0    Protein, Urine, POC Negative Negative    Urobilinogen, POC 1 mg/dL     Nitrite, Urine, POC Negative Negative    Leukocyte Esterase, Urine, POC Negative Negative       UA - Micro  WBC - 0  RBC - 0  Bacteria - 0  Epith - 0    Assessment/Plan    ICD-10-CM    1. Malignant neoplasm of right testis, unspecified whether descended or undescended ([facility]  C62.91 AMB POC URINALYSIS DIP STICK AUTO W/O MICRO      2. Vasectomy evaluation  Z30.09 AMB POC URINALYSIS DIP STICK AUTO W/O MICRO        All risks, benefits and alternatives to the procedure were reviewed in detail with the patient and he is willing to proceed with a vasectomy. I did inform him that he is not cleared to discontinue contraception until he produces at least one semen analysis with azoospermia or rare non motile sperm following his vasectomy. I recommended that he bring his first post vasectomy semen specimen into the office no sooner than 8 weeks post op . I also informed him that this must be considered a permanent procedure. Should fertility be desired post vasectomy his options include vasectomy reversal and/or sperm retrieval.  I explained that these options can be expensive and not always successful. We discussed possible side effects during and following his vasectomy which include but are not limited to; bleeding, infection, hydrocele, sperm granuloma, acute pain, chronic pain, and/or testicular atrophy. I discussed the possibility performing a unilateral vasectomy twice which would lead to persistent sperm and the need for a redo vasectomy. I also discussed the risk of recanalization and the risk of pregnancy despite initial clearance of sperm and approval to stop contraception. Rx provided for Valium 10mg tab #1 to be taken one hr prior to the procedure. Pt was instructed not to drive on this medication. Cont surveillance per Dr. Bibiana Guallpa.       VALERY HICKEY, DO

## 2023-03-27 ENCOUNTER — HOSPITAL ENCOUNTER (OUTPATIENT)
Dept: CT IMAGING | Age: 43
Discharge: HOME | End: 2023-03-30

## 2023-03-27 DIAGNOSIS — C62.91 SEMINOMA OF RIGHT TESTIS, STAGE 1 (HCC): ICD-10-CM

## 2023-04-14 ENCOUNTER — HOSPITAL ENCOUNTER (OUTPATIENT)
Dept: LAB | Age: 43
Discharge: HOME | End: 2023-04-17
Payer: COMMERCIAL

## 2023-04-14 DIAGNOSIS — C62.91 SEMINOMA OF RIGHT TESTIS, STAGE 1 (HCC): ICD-10-CM

## 2023-04-14 LAB
AFP-TM SERPL-MCNC: 1.6 NG/ML
ALBUMIN SERPL-MCNC: 4.2 G/DL (ref 3.5–5)
ALBUMIN/GLOB SERPL: 1.1 (ref 0.4–1.6)
ALP SERPL-CCNC: 48 U/L (ref 50–136)
ALT SERPL-CCNC: 66 U/L (ref 12–65)
ANION GAP SERPL CALC-SCNC: 2 MMOL/L (ref 2–11)
AST SERPL-CCNC: 36 U/L (ref 15–37)
BASOPHILS # BLD: 0 K/UL (ref 0–0.2)
BASOPHILS NFR BLD: 1 % (ref 0–2)
BILIRUB SERPL-MCNC: 1.2 MG/DL (ref 0.2–1.1)
BUN SERPL-MCNC: 24 MG/DL (ref 6–23)
CALCIUM SERPL-MCNC: 9.1 MG/DL (ref 8.3–10.4)
CHLORIDE SERPL-SCNC: 108 MMOL/L (ref 101–110)
CO2 SERPL-SCNC: 29 MMOL/L (ref 21–32)
CREAT SERPL-MCNC: 1.3 MG/DL (ref 0.8–1.5)
DIFFERENTIAL METHOD BLD: ABNORMAL
EOSINOPHIL # BLD: 0.1 K/UL (ref 0–0.8)
EOSINOPHIL NFR BLD: 3 % (ref 0.5–7.8)
ERYTHROCYTE [DISTWIDTH] IN BLOOD BY AUTOMATED COUNT: 14.8 % (ref 11.9–14.6)
GLOBULIN SER CALC-MCNC: 3.7 G/DL (ref 2.8–4.5)
GLUCOSE SERPL-MCNC: 98 MG/DL (ref 65–100)
HCG SERPL-ACNC: <1 MIU/ML (ref 0–2)
HCT VFR BLD AUTO: 53.7 % (ref 41.1–50.3)
HGB BLD-MCNC: 17.7 G/DL (ref 13.6–17.2)
IMM GRANULOCYTES # BLD AUTO: 0 K/UL (ref 0–0.5)
IMM GRANULOCYTES NFR BLD AUTO: 0 % (ref 0–5)
LDH SERPL L TO P-CCNC: 180 U/L (ref 100–190)
LYMPHOCYTES # BLD: 2.1 K/UL (ref 0.5–4.6)
LYMPHOCYTES NFR BLD: 38 % (ref 13–44)
MCH RBC QN AUTO: 28.9 PG (ref 26.1–32.9)
MCHC RBC AUTO-ENTMCNC: 33 G/DL (ref 31.4–35)
MCV RBC AUTO: 87.7 FL (ref 82–102)
MONOCYTES # BLD: 0.6 K/UL (ref 0.1–1.3)
MONOCYTES NFR BLD: 12 % (ref 4–12)
NEUTS SEG # BLD: 2.6 K/UL (ref 1.7–8.2)
NEUTS SEG NFR BLD: 47 % (ref 43–78)
NRBC # BLD: 0 K/UL (ref 0–0.2)
PLATELET # BLD AUTO: 260 K/UL (ref 150–450)
PMV BLD AUTO: 9.8 FL (ref 9.4–12.3)
POTASSIUM SERPL-SCNC: 3.9 MMOL/L (ref 3.5–5.1)
PROT SERPL-MCNC: 7.9 G/DL (ref 6.3–8.2)
RBC # BLD AUTO: 6.12 M/UL (ref 4.23–5.6)
SODIUM SERPL-SCNC: 139 MMOL/L (ref 133–143)
WBC # BLD AUTO: 5.5 K/UL (ref 4.3–11.1)

## 2023-04-14 PROCEDURE — 36415 COLL VENOUS BLD VENIPUNCTURE: CPT

## 2023-04-14 PROCEDURE — 80053 COMPREHEN METABOLIC PANEL: CPT

## 2023-04-14 PROCEDURE — 84702 CHORIONIC GONADOTROPIN TEST: CPT

## 2023-04-14 PROCEDURE — 85025 COMPLETE CBC W/AUTO DIFF WBC: CPT

## 2023-04-14 PROCEDURE — 82105 ALPHA-FETOPROTEIN SERUM: CPT

## 2023-04-14 PROCEDURE — 83615 LACTATE (LD) (LDH) ENZYME: CPT

## 2023-07-13 DIAGNOSIS — C62.91 SEMINOMA OF RIGHT TESTIS, STAGE 1 (HCC): Primary | ICD-10-CM

## 2023-07-13 NOTE — PROGRESS NOTES
apices to the symphysis pubis after the intravenous administration of 100 mL Isovue 370. Reformats are provided. Radiation dose reduction techniques were used for this study. Our CT scanners use one or all of the following: Automated exposure control, adjustment of the mA and/or kV according to patient size, iterative reconstruction. FINDINGS: CHEST: Base of Neck/Chest Wall: Unremarkable. Mediastinum: No enlarged mediastinal or hilar adenopathy. The heart is normal in size. No pericardial effusion. The thoracic aorta and main pulmonary artery are normal caliber. Small hiatal hernia. Airways/Lungs/Pleura: The large central airways are clear. Redemonstration of a partially calcified granuloma versus hamartoma of the right upper lobe. No suspicious pulmonary nodule, focal groundglass attenuation, or consolidation. No pleural fluid collections or pneumothorax. ABDOMEN AND PELVIS: Liver: Unremarkable. Gallbladder/Biliary: Unremarkable. No biliary ductal dilatation. Pancreas: Unremarkable. No main ductal dilatation. Spleen: Unremarkable. Adrenal glands: Unremarkable. Kidneys/Ureters: Symmetric bilateral enhancement. Administration of similar nonobstructing right renal calculi measuring up to 1.7 cm. No hydronephrosis or suspicious lesion. Bladder: Partially distended bladder is unremarkable. Reproduction:Prostate seminal unremarkable. Partial visualization related to prior right orchiectomy. Bowel/Mesentery/Retroperitoneum/Peritoneum: Normal caliber small and large bowel without wall thickening or inflammatory changes. The appendix is normal. No enlarged mesentery or retroperitoneal adenopathy. No peritoneal thickening or ascites. Vasculature: Abdominal aorta is normal caliber. Musculoskeletal: No acute or aggressive osseous abnormalities. The soft tissues are unremarkable. 1.  No evidence of distant metastatic disease to the chest, abdomen, or pelvis in this patient post right orchiectomy.  2.  Similar size and

## 2023-07-14 ENCOUNTER — OFFICE VISIT (OUTPATIENT)
Dept: ONCOLOGY | Age: 43
End: 2023-07-14
Payer: COMMERCIAL

## 2023-07-14 ENCOUNTER — HOSPITAL ENCOUNTER (OUTPATIENT)
Dept: LAB | Age: 43
Discharge: HOME | End: 2023-07-14
Payer: COMMERCIAL

## 2023-07-14 VITALS
SYSTOLIC BLOOD PRESSURE: 128 MMHG | RESPIRATION RATE: 16 BRPM | HEART RATE: 100 BPM | DIASTOLIC BLOOD PRESSURE: 90 MMHG | HEIGHT: 72 IN | TEMPERATURE: 98.3 F | WEIGHT: 231 LBS | BODY MASS INDEX: 31.29 KG/M2 | OXYGEN SATURATION: 93 %

## 2023-07-14 DIAGNOSIS — C62.91 SEMINOMA OF RIGHT TESTIS, STAGE 1 (HCC): ICD-10-CM

## 2023-07-14 DIAGNOSIS — C62.91 SEMINOMA OF RIGHT TESTIS, STAGE 1 (HCC): Primary | ICD-10-CM

## 2023-07-14 LAB
AFP-TM SERPL-MCNC: 1.2 NG/ML
ALBUMIN SERPL-MCNC: 4.1 G/DL (ref 3.5–5)
ALBUMIN/GLOB SERPL: 1.1 (ref 0.4–1.6)
ALP SERPL-CCNC: 51 U/L (ref 50–136)
ALT SERPL-CCNC: 67 U/L (ref 12–65)
ANION GAP SERPL CALC-SCNC: 5 MMOL/L (ref 2–11)
AST SERPL-CCNC: 30 U/L (ref 15–37)
BASOPHILS # BLD: 0 K/UL (ref 0–0.2)
BASOPHILS NFR BLD: 1 % (ref 0–2)
BILIRUB SERPL-MCNC: 1.1 MG/DL (ref 0.2–1.1)
BUN SERPL-MCNC: 21 MG/DL (ref 6–23)
CALCIUM SERPL-MCNC: 8.9 MG/DL (ref 8.3–10.4)
CHLORIDE SERPL-SCNC: 104 MMOL/L (ref 101–110)
CO2 SERPL-SCNC: 28 MMOL/L (ref 21–32)
CREAT SERPL-MCNC: 1.1 MG/DL (ref 0.8–1.5)
DIFFERENTIAL METHOD BLD: ABNORMAL
EOSINOPHIL # BLD: 0.1 K/UL (ref 0–0.8)
EOSINOPHIL NFR BLD: 2 % (ref 0.5–7.8)
ERYTHROCYTE [DISTWIDTH] IN BLOOD BY AUTOMATED COUNT: 13.3 % (ref 11.9–14.6)
GLOBULIN SER CALC-MCNC: 3.6 G/DL (ref 2.8–4.5)
GLUCOSE SERPL-MCNC: 105 MG/DL (ref 65–100)
HCG SERPL-ACNC: <1 MIU/ML (ref 0–2)
HCT VFR BLD AUTO: 54.3 % (ref 41.1–50.3)
HGB BLD-MCNC: 18.5 G/DL (ref 13.6–17.2)
IMM GRANULOCYTES # BLD AUTO: 0 K/UL (ref 0–0.5)
IMM GRANULOCYTES NFR BLD AUTO: 1 % (ref 0–5)
LDH SERPL L TO P-CCNC: 179 U/L (ref 100–190)
LYMPHOCYTES # BLD: 2.4 K/UL (ref 0.5–4.6)
LYMPHOCYTES NFR BLD: 35 % (ref 13–44)
MCH RBC QN AUTO: 30.2 PG (ref 26.1–32.9)
MCHC RBC AUTO-ENTMCNC: 34.1 G/DL (ref 31.4–35)
MCV RBC AUTO: 88.7 FL (ref 82–102)
MONOCYTES # BLD: 0.9 K/UL (ref 0.1–1.3)
MONOCYTES NFR BLD: 13 % (ref 4–12)
NEUTS SEG # BLD: 3.4 K/UL (ref 1.7–8.2)
NEUTS SEG NFR BLD: 48 % (ref 43–78)
NRBC # BLD: 0 K/UL (ref 0–0.2)
PLATELET # BLD AUTO: 244 K/UL (ref 150–450)
PMV BLD AUTO: 9.3 FL (ref 9.4–12.3)
POTASSIUM SERPL-SCNC: 4 MMOL/L (ref 3.5–5.1)
PROT SERPL-MCNC: 7.7 G/DL (ref 6.3–8.2)
RBC # BLD AUTO: 6.12 M/UL (ref 4.23–5.6)
SODIUM SERPL-SCNC: 137 MMOL/L (ref 133–143)
WBC # BLD AUTO: 6.9 K/UL (ref 4.3–11.1)

## 2023-07-14 PROCEDURE — 99214 OFFICE O/P EST MOD 30 MIN: CPT | Performed by: NURSE PRACTITIONER

## 2023-07-14 PROCEDURE — 83615 LACTATE (LD) (LDH) ENZYME: CPT

## 2023-07-14 PROCEDURE — 36415 COLL VENOUS BLD VENIPUNCTURE: CPT

## 2023-07-14 PROCEDURE — 84702 CHORIONIC GONADOTROPIN TEST: CPT

## 2023-07-14 PROCEDURE — 82105 ALPHA-FETOPROTEIN SERUM: CPT

## 2023-07-14 PROCEDURE — 85025 COMPLETE CBC W/AUTO DIFF WBC: CPT

## 2023-07-14 PROCEDURE — 80053 COMPREHEN METABOLIC PANEL: CPT

## 2023-07-14 ASSESSMENT — PATIENT HEALTH QUESTIONNAIRE - PHQ9
2. FEELING DOWN, DEPRESSED OR HOPELESS: 0
SUM OF ALL RESPONSES TO PHQ QUESTIONS 1-9: 0
1. LITTLE INTEREST OR PLEASURE IN DOING THINGS: 0
SUM OF ALL RESPONSES TO PHQ9 QUESTIONS 1 & 2: 0

## 2023-07-19 NOTE — PROGRESS NOTES
Patient verified name and     Order for consent WAS NOT found in EHR and matches case posting; patient verified. Type 1B surgery, PHONE assessment complete. Labs per surgeon: PENDING ORDERS  Labs per anesthesia protocol: POTASSIUM DOS--ORDER PLACED IN CC  EKG: [facility] approved surgical skin cleanser and instructions given per hospital policy. Patient provided with and instructed on educational handouts including Guide to Surgery, Pain Management, Hand Hygiene, Blood Transfusion Education, and [place] [facility] Brochure. Patient answered medical/surgical history questions at their best of ability. All prior to admission medications documented in [facility]. Original medication prescription bottle WAS NOT visualized during patient appointment. Patient instructed to hold all vitamins 7 days prior to surgery and NSAIDS 5 days prior to surgery, patient verbalized understanding. Patient teach back successful and patient demonstrates knowledge of instructions. Yes...

## 2023-10-09 ENCOUNTER — HOSPITAL ENCOUNTER (OUTPATIENT)
Dept: CT IMAGING | Age: 43
Discharge: HOME | End: 2023-10-12
Payer: COMMERCIAL

## 2023-10-09 DIAGNOSIS — C62.91 SEMINOMA OF RIGHT TESTIS, STAGE 1 (HCC): ICD-10-CM

## 2023-10-09 LAB — CREAT BLD-MCNC: 0.94 MG/DL (ref 0.8–1.5)

## 2023-10-09 PROCEDURE — 82565 ASSAY OF CREATININE: CPT

## 2023-10-09 PROCEDURE — 6360000004 HC RX CONTRAST MEDICATION: Performed by: INTERNAL MEDICINE

## 2023-10-09 PROCEDURE — 74177 CT ABD & PELVIS W/CONTRAST: CPT

## 2023-10-09 RX ADMIN — IOPAMIDOL 100 ML: 755 INJECTION, SOLUTION INTRAVENOUS at 15:41

## 2023-10-09 RX ADMIN — DIATRIZOATE MEGLUMINE AND DIATRIZOATE SODIUM 15 ML: 660; 100 LIQUID ORAL; RECTAL at 15:41

## 2023-10-13 DIAGNOSIS — C62.91 SEMINOMA OF RIGHT TESTIS, STAGE 1 (HCC): Primary | ICD-10-CM

## 2023-10-16 ENCOUNTER — OFFICE VISIT (OUTPATIENT)
Dept: ONCOLOGY | Age: 43
End: 2023-10-16
Payer: COMMERCIAL

## 2023-10-16 ENCOUNTER — HOSPITAL ENCOUNTER (OUTPATIENT)
Dept: LAB | Age: 43
Discharge: HOME | End: 2023-10-19
Payer: COMMERCIAL

## 2023-10-16 VITALS
HEIGHT: 72 IN | DIASTOLIC BLOOD PRESSURE: 74 MMHG | BODY MASS INDEX: 32.14 KG/M2 | TEMPERATURE: 98 F | OXYGEN SATURATION: 95 % | SYSTOLIC BLOOD PRESSURE: 110 MMHG | WEIGHT: 237.3 LBS | HEART RATE: 102 BPM | RESPIRATION RATE: 16 BRPM

## 2023-10-16 DIAGNOSIS — C62.91 SEMINOMA OF RIGHT TESTIS, STAGE 1 (HCC): ICD-10-CM

## 2023-10-16 DIAGNOSIS — C62.91 SEMINOMA OF RIGHT TESTIS, STAGE 1 (HCC): Primary | ICD-10-CM

## 2023-10-16 LAB
ALBUMIN SERPL-MCNC: 3.9 G/DL (ref 3.5–5)
ALBUMIN/GLOB SERPL: 1 (ref 0.4–1.6)
ALP SERPL-CCNC: 45 U/L (ref 50–136)
ALT SERPL-CCNC: 54 U/L (ref 12–65)
ANION GAP SERPL CALC-SCNC: 5 MMOL/L (ref 2–11)
AST SERPL-CCNC: 29 U/L (ref 15–37)
BASOPHILS # BLD: 0 K/UL (ref 0–0.2)
BASOPHILS NFR BLD: 1 % (ref 0–2)
BILIRUB SERPL-MCNC: 1.1 MG/DL (ref 0.2–1.1)
BUN SERPL-MCNC: 17 MG/DL (ref 6–23)
CALCIUM SERPL-MCNC: 8.5 MG/DL (ref 8.3–10.4)
CHLORIDE SERPL-SCNC: 103 MMOL/L (ref 101–110)
CO2 SERPL-SCNC: 31 MMOL/L (ref 21–32)
CREAT SERPL-MCNC: 1.2 MG/DL (ref 0.8–1.5)
DIFFERENTIAL METHOD BLD: ABNORMAL
EOSINOPHIL # BLD: 0.2 K/UL (ref 0–0.8)
EOSINOPHIL NFR BLD: 3 % (ref 0.5–7.8)
ERYTHROCYTE [DISTWIDTH] IN BLOOD BY AUTOMATED COUNT: 13.2 % (ref 11.9–14.6)
GLOBULIN SER CALC-MCNC: 3.8 G/DL (ref 2.8–4.5)
GLUCOSE SERPL-MCNC: 113 MG/DL (ref 65–100)
HCG SERPL-ACNC: <1 MIU/ML (ref 0–2)
HCT VFR BLD AUTO: 50.6 % (ref 41.1–50.3)
HGB BLD-MCNC: 17.2 G/DL (ref 13.6–17.2)
IMM GRANULOCYTES # BLD AUTO: 0 K/UL (ref 0–0.5)
IMM GRANULOCYTES NFR BLD AUTO: 1 % (ref 0–5)
LDH SERPL L TO P-CCNC: 152 U/L (ref 100–190)
LYMPHOCYTES # BLD: 2.3 K/UL (ref 0.5–4.6)
LYMPHOCYTES NFR BLD: 40 % (ref 13–44)
MCH RBC QN AUTO: 30 PG (ref 26.1–32.9)
MCHC RBC AUTO-ENTMCNC: 34 G/DL (ref 31.4–35)
MCV RBC AUTO: 88.2 FL (ref 82–102)
MONOCYTES # BLD: 0.6 K/UL (ref 0.1–1.3)
MONOCYTES NFR BLD: 11 % (ref 4–12)
NEUTS SEG # BLD: 2.6 K/UL (ref 1.7–8.2)
NEUTS SEG NFR BLD: 44 % (ref 43–78)
NRBC # BLD: 0 K/UL (ref 0–0.2)
PLATELET # BLD AUTO: 248 K/UL (ref 150–450)
PMV BLD AUTO: 9.7 FL (ref 9.4–12.3)
POTASSIUM SERPL-SCNC: 3.2 MMOL/L (ref 3.5–5.1)
PROT SERPL-MCNC: 7.7 G/DL (ref 6.3–8.2)
RBC # BLD AUTO: 5.74 M/UL (ref 4.23–5.6)
SODIUM SERPL-SCNC: 139 MMOL/L (ref 133–143)
WBC # BLD AUTO: 5.8 K/UL (ref 4.3–11.1)

## 2023-10-16 PROCEDURE — 85025 COMPLETE CBC W/AUTO DIFF WBC: CPT

## 2023-10-16 PROCEDURE — 80053 COMPREHEN METABOLIC PANEL: CPT

## 2023-10-16 PROCEDURE — 36415 COLL VENOUS BLD VENIPUNCTURE: CPT

## 2023-10-16 PROCEDURE — 82105 ALPHA-FETOPROTEIN SERUM: CPT

## 2023-10-16 PROCEDURE — 99214 OFFICE O/P EST MOD 30 MIN: CPT | Performed by: INTERNAL MEDICINE

## 2023-10-16 PROCEDURE — 84702 CHORIONIC GONADOTROPIN TEST: CPT

## 2023-10-16 PROCEDURE — 83615 LACTATE (LD) (LDH) ENZYME: CPT

## 2023-10-16 RX ORDER — GEMFIBROZIL 600 MG/1
600 TABLET, FILM COATED ORAL 2 TIMES DAILY
COMMUNITY

## 2023-10-16 RX ORDER — LOSARTAN POTASSIUM AND HYDROCHLOROTHIAZIDE 25; 100 MG/1; MG/1
TABLET ORAL
COMMUNITY
Start: 2023-08-16

## 2023-10-16 ASSESSMENT — PATIENT HEALTH QUESTIONNAIRE - PHQ9: DEPRESSION UNABLE TO ASSESS: PT REFUSES

## 2023-10-16 NOTE — PROGRESS NOTES
[facility] Hematology and Oncology: Office Visit Established Patient    Chief Complaint:    Chief Complaint   Patient presents with    Follow-up     History of Present Illness:  Mr. Melodie Will is a 39 y.o. male who presents today for follow-up regarding seminoma. He presented to [facility] on 9/8/22 reporting a several week history of right testicular and lower abdominal pain. He also reported that his right testis had become firmer over time. He had a scrotal ultrasound done at [address] [place] [address] on 8/24/22 which showed a diffusely abnormal right testis, suspicious for testicular malignancy. Labs drawn on 9/12/22 were significant for elevated beta HCG at 13. AFP was WNL at 1.30; PSA was WNL at 1.3, LD was WNL at 169; and testosterone was WNL at 418. CT of the chest, abdomen, and pelvis was performed on 9/16/22. Despite limited evaluation of testicular mass by CT, there was asymmetric increased attenuation of the right testis noted, which could reflect a testicular malignancy; no evidence of metastatic lymphadenopathy; a 3 mm right lower lobe lung nodule; and multiple non-obstructing right renal calculi with mild right hydroureteronephrosis and no visualized obstructing ureteral stone. On 9/22/22 patient underwent right radical orchiectomy with Dr. Sergo Cobos. 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 His pathologic stage was T1b (due to size over 3cm), clinically T1N0M0. We recommended observation due to the stage IA disease. He is here today for follow up. He has been very well overall since last seen. He denies any clinical changes or concerns. He denies any new pain. No swelling noted. He denies any fevers or other infectious symptoms. Review of Systems:  Constitutional: Negative. HENT: Negative. Eyes: Negative. Respiratory: Negative.

## 2023-10-18 LAB — AFP-TM SERPL-MCNC: <1.8 NG/ML (ref 0–6.9)

## 2024-05-07 DIAGNOSIS — C62.91 SEMINOMA OF RIGHT TESTIS, STAGE 1 (HCC): Primary | ICD-10-CM

## 2024-05-22 ENCOUNTER — OFFICE VISIT (OUTPATIENT)
Dept: ONCOLOGY | Age: 44
End: 2024-05-22
Payer: COMMERCIAL

## 2024-05-22 ENCOUNTER — HOSPITAL ENCOUNTER (OUTPATIENT)
Dept: LAB | Age: 44
Discharge: HOME | End: 2024-05-25
Payer: COMMERCIAL

## 2024-05-22 VITALS
WEIGHT: 242 LBS | RESPIRATION RATE: 22 BRPM | HEART RATE: 92 BPM | TEMPERATURE: 98.9 F | BODY MASS INDEX: 32.78 KG/M2 | DIASTOLIC BLOOD PRESSURE: 84 MMHG | HEIGHT: 72 IN | OXYGEN SATURATION: 99 % | SYSTOLIC BLOOD PRESSURE: 121 MMHG

## 2024-05-22 DIAGNOSIS — C62.91 SEMINOMA OF RIGHT TESTIS, STAGE 1 (HCC): ICD-10-CM

## 2024-05-22 DIAGNOSIS — C62.91 SEMINOMA OF RIGHT TESTIS, STAGE 1 (HCC): Primary | ICD-10-CM

## 2024-05-22 LAB
AFP-TM SERPL-MCNC: <1.82 NG/ML (ref 0–8.3)
ALBUMIN SERPL-MCNC: 4 G/DL (ref 3.5–5)
ALBUMIN/GLOB SERPL: 1.3 (ref 1–1.9)
ALP SERPL-CCNC: 46 U/L (ref 40–129)
ALT SERPL-CCNC: 53 U/L (ref 12–65)
ANION GAP SERPL CALC-SCNC: 11 MMOL/L (ref 9–18)
AST SERPL-CCNC: 33 U/L (ref 15–37)
BASOPHILS # BLD: 0 K/UL (ref 0–0.2)
BASOPHILS NFR BLD: 1 % (ref 0–2)
BILIRUB SERPL-MCNC: 0.7 MG/DL (ref 0–1.2)
BUN SERPL-MCNC: 20 MG/DL (ref 6–23)
CALCIUM SERPL-MCNC: 9.1 MG/DL (ref 8.8–10.2)
CHLORIDE SERPL-SCNC: 102 MMOL/L (ref 98–107)
CO2 SERPL-SCNC: 26 MMOL/L (ref 20–28)
CREAT SERPL-MCNC: 1.01 MG/DL (ref 0.8–1.3)
DIFFERENTIAL METHOD BLD: ABNORMAL
EOSINOPHIL # BLD: 0.3 K/UL (ref 0–0.8)
EOSINOPHIL NFR BLD: 4 % (ref 0.5–7.8)
ERYTHROCYTE [DISTWIDTH] IN BLOOD BY AUTOMATED COUNT: 13.7 % (ref 11.9–14.6)
GLOBULIN SER CALC-MCNC: 3.1 G/DL (ref 2.3–3.5)
GLUCOSE SERPL-MCNC: 105 MG/DL (ref 70–99)
HCG SERPL-ACNC: <1 MIU/ML
HCT VFR BLD AUTO: 53.5 % (ref 41.1–50.3)
HGB BLD-MCNC: 18.2 G/DL (ref 13.6–17.2)
IMM GRANULOCYTES # BLD AUTO: 0 K/UL (ref 0–0.5)
IMM GRANULOCYTES NFR BLD AUTO: 0 % (ref 0–5)
LDH SERPL L TO P-CCNC: 148 U/L (ref 127–281)
LYMPHOCYTES # BLD: 2.2 K/UL (ref 0.5–4.6)
LYMPHOCYTES NFR BLD: 36 % (ref 13–44)
MCH RBC QN AUTO: 30.2 PG (ref 26.1–32.9)
MCHC RBC AUTO-ENTMCNC: 34 G/DL (ref 31.4–35)
MCV RBC AUTO: 88.7 FL (ref 82–102)
MONOCYTES # BLD: 0.9 K/UL (ref 0.1–1.3)
MONOCYTES NFR BLD: 15 % (ref 4–12)
NEUTS SEG # BLD: 2.7 K/UL (ref 1.7–8.2)
NEUTS SEG NFR BLD: 44 % (ref 43–78)
NRBC # BLD: 0 K/UL (ref 0–0.2)
PLATELET # BLD AUTO: 255 K/UL (ref 150–450)
PMV BLD AUTO: 9.5 FL (ref 9.4–12.3)
POTASSIUM SERPL-SCNC: 3.7 MMOL/L (ref 3.5–5.1)
PROT SERPL-MCNC: 7.1 G/DL (ref 6.3–8.2)
RBC # BLD AUTO: 6.03 M/UL (ref 4.23–5.6)
SODIUM SERPL-SCNC: 139 MMOL/L (ref 136–145)
WBC # BLD AUTO: 6.2 K/UL (ref 4.3–11.1)

## 2024-05-22 PROCEDURE — 99214 OFFICE O/P EST MOD 30 MIN: CPT | Performed by: NURSE PRACTITIONER

## 2024-05-22 PROCEDURE — 82105 ALPHA-FETOPROTEIN SERUM: CPT

## 2024-05-22 PROCEDURE — 80053 COMPREHEN METABOLIC PANEL: CPT

## 2024-05-22 PROCEDURE — 84702 CHORIONIC GONADOTROPIN TEST: CPT

## 2024-05-22 PROCEDURE — 36415 COLL VENOUS BLD VENIPUNCTURE: CPT

## 2024-05-22 PROCEDURE — 83615 LACTATE (LD) (LDH) ENZYME: CPT

## 2024-05-22 PROCEDURE — 85025 COMPLETE CBC W/AUTO DIFF WBC: CPT

## 2024-05-22 ASSESSMENT — PATIENT HEALTH QUESTIONNAIRE - PHQ9
SUM OF ALL RESPONSES TO PHQ9 QUESTIONS 1 & 2: 0
2. FEELING DOWN, DEPRESSED OR HOPELESS: NOT AT ALL
1. LITTLE INTEREST OR PLEASURE IN DOING THINGS: NOT AT ALL
SUM OF ALL RESPONSES TO PHQ QUESTIONS 1-9: 0

## 2024-05-22 NOTE — PROGRESS NOTES
[facility] On[facility]: Office Visit Established Patient    Chief Complaint:    Chief Complaint   Patient presents with    Follow-up     History of Present Illness:  Mr. Dacosta is a 43 y.o. male who presents today for follow-up regarding seminoma.  He presented to [facility] on 9/8/22 reporting a several week history of right testicular and lower abdominal pain.  He also reported that his right testis had become firmer over time. He had a scrotal ultrasound done at [facility] on 8/24/22 which showed a diffusely abnormal right testis, suspicious for testicular malignancy. Labs drawn on 9/12/22 were significant for elevated beta HCG at 13. AFP was WNL at 1.30; PSA was WNL at 1.3, LD was WNL at 169; and testosterone was WNL at 418. CT of the chest, abdomen, and pelvis was performed on 9/16/22. Despite limited evaluation of testicular mass by CT, there was asymmetric increased attenuation of the right testis noted, which could reflect a testicular malignancy; no evidence of metastatic lymphadenopathy; a 3 mm right lower lobe lung nodule; and multiple non-obstructing right renal calculi with mild right hydroureteronephrosis and no visualized obstructing ureteral stone. On 9/22/22 patient underwent right radical orchiectomy with Dr. Moreno Humphrey. 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  His pathologic stage was T1b (due to size over 3cm), clinically T1N0M0.  We recommended observation due to the stage IA disease.     He returns today for routine 6 month follow up.  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  Appetite is good and weight is stable.  Energy level is OK.  There are no respiratory symptoms.  No edema or pain.        Review of Systems:  Constitutional: Negative.   HENT: Negative.

## 2024-11-22 ENCOUNTER — HOSPITAL ENCOUNTER (OUTPATIENT)
Dept: LAB | Age: 44
Discharge: HOME | End: 2024-11-22
Payer: COMMERCIAL

## 2024-11-22 ENCOUNTER — OFFICE VISIT (OUTPATIENT)
Dept: ONCOLOGY | Age: 44
End: 2024-11-22
Payer: COMMERCIAL

## 2024-11-22 VITALS
BODY MASS INDEX: 31.65 KG/M2 | TEMPERATURE: 97.4 F | SYSTOLIC BLOOD PRESSURE: 117 MMHG | WEIGHT: 233.7 LBS | DIASTOLIC BLOOD PRESSURE: 76 MMHG | OXYGEN SATURATION: 98 % | HEIGHT: 72 IN | HEART RATE: 106 BPM | RESPIRATION RATE: 16 BRPM

## 2024-11-22 DIAGNOSIS — D75.1 SECONDARY POLYCYTHEMIA: ICD-10-CM

## 2024-11-22 DIAGNOSIS — C62.91 SEMINOMA OF RIGHT TESTIS, STAGE 1 (HCC): ICD-10-CM

## 2024-11-22 DIAGNOSIS — C62.91 SEMINOMA OF RIGHT TESTIS, STAGE 1 (HCC): Primary | ICD-10-CM

## 2024-11-22 LAB
AFP-TM SERPL-MCNC: <1.82 NG/ML (ref 0–8.3)
ALBUMIN SERPL-MCNC: 3.9 G/DL (ref 3.5–5)
ALBUMIN/GLOB SERPL: 1.1 (ref 1–1.9)
ALP SERPL-CCNC: 42 U/L (ref 40–129)
ALT SERPL-CCNC: 37 U/L (ref 8–55)
ANION GAP SERPL CALC-SCNC: 12 MMOL/L (ref 7–16)
AST SERPL-CCNC: 30 U/L (ref 15–37)
BASOPHILS # BLD: 0 K/UL (ref 0–0.2)
BASOPHILS NFR BLD: 1 % (ref 0–2)
BILIRUB SERPL-MCNC: 1.3 MG/DL (ref 0–1.2)
BUN SERPL-MCNC: 17 MG/DL (ref 6–23)
CALCIUM SERPL-MCNC: 9 MG/DL (ref 8.8–10.2)
CHLORIDE SERPL-SCNC: 98 MMOL/L (ref 98–107)
CO2 SERPL-SCNC: 27 MMOL/L (ref 20–29)
CREAT SERPL-MCNC: 1.01 MG/DL (ref 0.8–1.3)
DIFFERENTIAL METHOD BLD: ABNORMAL
EOSINOPHIL # BLD: 0.2 K/UL (ref 0–0.8)
EOSINOPHIL NFR BLD: 4 % (ref 0.5–7.8)
ERYTHROCYTE [DISTWIDTH] IN BLOOD BY AUTOMATED COUNT: 13.4 % (ref 11.9–14.6)
GLOBULIN SER CALC-MCNC: 3.6 G/DL (ref 2.3–3.5)
GLUCOSE SERPL-MCNC: 107 MG/DL (ref 70–99)
HCG SERPL-ACNC: <1 MIU/ML
HCT VFR BLD AUTO: 53.2 % (ref 41.1–50.3)
HGB BLD-MCNC: 17.9 G/DL (ref 13.6–17.2)
IMM GRANULOCYTES # BLD AUTO: 0 K/UL (ref 0–0.5)
IMM GRANULOCYTES NFR BLD AUTO: 0 % (ref 0–5)
LDH SERPL L TO P-CCNC: 145 U/L (ref 127–281)
LYMPHOCYTES # BLD: 2 K/UL (ref 0.5–4.6)
LYMPHOCYTES NFR BLD: 34 % (ref 13–44)
MCH RBC QN AUTO: 28.6 PG (ref 26.1–32.9)
MCHC RBC AUTO-ENTMCNC: 33.6 G/DL (ref 31.4–35)
MCV RBC AUTO: 85.1 FL (ref 82–102)
MONOCYTES # BLD: 0.7 K/UL (ref 0.1–1.3)
MONOCYTES NFR BLD: 12 % (ref 4–12)
NEUTS SEG # BLD: 2.8 K/UL (ref 1.7–8.2)
NEUTS SEG NFR BLD: 49 % (ref 43–78)
NRBC # BLD: 0 K/UL (ref 0–0.2)
PLATELET # BLD AUTO: 274 K/UL (ref 150–450)
PMV BLD AUTO: 9.3 FL (ref 9.4–12.3)
POTASSIUM SERPL-SCNC: 3.1 MMOL/L (ref 3.5–5.1)
PROT SERPL-MCNC: 7.6 G/DL (ref 6.3–8.2)
RBC # BLD AUTO: 6.25 M/UL (ref 4.23–5.6)
SODIUM SERPL-SCNC: 137 MMOL/L (ref 136–145)
WBC # BLD AUTO: 5.7 K/UL (ref 4.3–11.1)

## 2024-11-22 PROCEDURE — 82105 ALPHA-FETOPROTEIN SERUM: CPT

## 2024-11-22 PROCEDURE — 83615 LACTATE (LD) (LDH) ENZYME: CPT

## 2024-11-22 PROCEDURE — 85025 COMPLETE CBC W/AUTO DIFF WBC: CPT

## 2024-11-22 PROCEDURE — 99214 OFFICE O/P EST MOD 30 MIN: CPT | Performed by: INTERNAL MEDICINE

## 2024-11-22 PROCEDURE — 36415 COLL VENOUS BLD VENIPUNCTURE: CPT

## 2024-11-22 PROCEDURE — 80053 COMPREHEN METABOLIC PANEL: CPT

## 2024-11-22 PROCEDURE — 84702 CHORIONIC GONADOTROPIN TEST: CPT

## 2024-11-22 NOTE — PROGRESS NOTES
[facility] Hematology and Oncology: Office Visit Established Patient    Chief Complaint:    Chief Complaint   Patient presents with    Follow-up     History of Present Illness:  Mr. Dacosta is a 44 y.o. male who presents today for follow-up regarding seminoma.  He presented to [facility] on 9/8/22 reporting a several week history of right testicular and lower abdominal pain.  He also reported that his right testis had become firmer over time. He had a scrotal ultrasound done at [facility] on 8/24/22 which showed a diffusely abnormal right testis, suspicious for testicular malignancy. Labs drawn on 9/12/22 were significant for elevated beta HCG at 13. AFP was WNL at 1.30; PSA was WNL at 1.3, LD was WNL at 169; and testosterone was WNL at 418. CT of the chest, abdomen, and pelvis was performed on 9/16/22. Despite limited evaluation of testicular mass by CT, there was asymmetric increased attenuation of the right testis noted, which could reflect a testicular malignancy; no evidence of metastatic lymphadenopathy; a 3 mm right lower lobe lung nodule; and multiple non-obstructing right renal calculi with mild right hydroureteronephrosis and no visualized obstructing ureteral stone. On 9/22/22 patient underwent right radical orchiectomy with Dr. Moreno Humphrey. 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  His pathologic stage was T1b (due to size over 3cm), clinically T1N0M0.  We recommended observation due to the stage IA disease.    History of Present Illness  The patient is a 44-year-old male who presents for follow-up of testicular cancer.  He did not undergo chemotherapy following his surgery. His cancer was classified as stage 1.  He reports no new health issues since his last visit. He denies experiencing any swelling, testicular problems, or the presence of masses or

## 2024-11-22 NOTE — PATIENT INSTRUCTIONS
Patient Information from Today's Visit    The members of your [facility] are listed below:    Physician Provider: Gregroy Madrid, Medical Oncologist  Advanced Practice Clinician: Azucena Taveras NP  Registered Nurse: Rigoberto STEINBERG RN  Nurse Navigator: Rhonda KAYE RN  Medical Assistant: Johanny GAXIOLA CMA  :Kyra CARMONA  Supportive Care Services: Garrick LANCE LMSW    Diagnosis: Seminoma    Follow Up Instructions: 6 months with CT scan prior      Treatment Summary has been discussed and given to patient:No      Current Labs:   Hospital Outpatient Visit on 11/22/2024   Component Date Value Ref Range Status    WBC 11/22/2024 5.7  4.3 - 11.1 K/uL Final    RBC 11/22/2024 6.25 (H)  4.23 - 5.6 M/uL Final    Hemoglobin 11/22/2024 17.9 (H)  13.6 - 17.2 g/dL Final    RESULTS CHECKED X 2    Hematocrit 11/22/2024 53.2 (H)  41.1 - 50.3 % Final    MCV 11/22/2024 85.1  82.0 - 102.0 FL Final    MCH 11/22/2024 28.6  26.1 - 32.9 PG Final    MCHC 11/22/2024 33.6  31.4 - 35.0 g/dL Final    RDW 11/22/2024 13.4  11.9 - 14.6 % Final    Platelets 11/22/2024 274  150 - 450 K/uL Final    MPV 11/22/2024 9.3 (L)  9.4 - 12.3 FL Final    nRBC 11/22/2024 0.00  0.0 - 0.2 K/uL Final    **Note: Absolute NRBC parameter is now reported with Hemogram**    Differential Type 11/22/2024 AUTOMATED    Final    Neutrophils % 11/22/2024 49  43 - 78 % Final    Lymphocytes % 11/22/2024 34  13 - 44 % Final    Monocytes % 11/22/2024 12  4.0 - 12.0 % Final    Eosinophils % 11/22/2024 4  0.5 - 7.8 % Final    Basophils % 11/22/2024 1  0.0 - 2.0 % Final    Immature Granulocytes % 11/22/2024 0  0.0 - 5.0 % Final    Neutrophils Absolute 11/22/2024 2.8  1.7 - 8.2 K/UL Final    Lymphocytes Absolute 11/22/2024 2.0  0.5 - 4.6 K/UL Final    Monocytes Absolute 11/22/2024 0.7  0.1 - 1.3 K/UL Final    Eosinophils Absolute 11/22/2024 0.2  0.0 - 0.8 K/UL Final    Basophils Absolute 11/22/2024 0.0  0.0 - 0.2 K/UL Final    Immature Granulocytes Absolute 11/22/2024 0.0  0.0 -

## 2025-06-26 NOTE — PROGRESS NOTES
Eyes/Ears/Nose:   Conjunctivae/corneas clear. PERRL. Nasal mucosa is normal.  Normal TMs and external auditory canals.        Mouth/Throat:  Lips, mucosa, and tongue normal. Teeth and gums normal.        Lungs:     CTA without wheezes or crackles     Heart:   Regular rate and rhythm, S1, S2 normal, no murmur, click, rub or gallop.     Abdomen:    Soft, non-tender.     Extremities:  Extremities normal, atraumatic, no cyanosis or edema.     Skin:  Skin color normal. No rashes or lesions     Neurologic:  A&Ox3     DIAGNOSTIC TESTS:                                                                                                                        Pulmonary Function Testing:   Date   6/26/2025        FVC 4.80 (88%)        FEV1 4.03 (93%)        FEV1/FVC 0.84        FEF 25-75%         TLC         FRC         RV         DLCO         PFTs:       Latest Ref Rng & Units 6/27/2025    12:00 AM   Office Spirometry Results   FVC L 4.80    FEV1 L 4.03    FEV1 %Pred-Pre % 93    FVC %Pred-Pre % 88    FEV1/FVC % 84      No results found for this or any previous visit. No results found for this or any previous visit.    AMBULATING OXIMETRY: 6/27/25    O2 sat HR   Room air at Rest 99% 105bpm   Room air ambulating 94% 156bpm   FeNO: No results found for this or any previous visit.    FENO    LOW  <25ppb INTERMEDIATE  25-50ppb HIGH  >50ppb   6/27/25  30ppb      9/2015      LABS:   Lab Results   Component Value Date/Time    WBC 5.7 11/22/2024 08:35 AM    HGB 17.9 11/22/2024 08:35 AM    HCT 53.2 11/22/2024 08:35 AM     11/22/2024 08:35 AM     Imaging: I performed an independent interpretation of the patient's images.  CXR: No results found for this or any previous visit from the past 3650 days.    CT Chest:   CT CHEST ABDOMEN PELVIS W CONTRAST 06/24/2024    [facility]  CT of the Chest, Abdomen, and Pelvis    INDICATION: Right testicular seminoma    TECHNIQUE: Multiple 2D axial images were obtained through the chest, abdomen,  and

## 2025-06-27 ENCOUNTER — OFFICE VISIT (OUTPATIENT)
Dept: PULMONOLOGY | Age: 45
End: 2025-06-27

## 2025-06-27 VITALS
DIASTOLIC BLOOD PRESSURE: 94 MMHG | OXYGEN SATURATION: 97 % | SYSTOLIC BLOOD PRESSURE: 147 MMHG | BODY MASS INDEX: 31.56 KG/M2 | RESPIRATION RATE: 17 BRPM | HEART RATE: 119 BPM | HEIGHT: 72 IN | WEIGHT: 233 LBS

## 2025-06-27 DIAGNOSIS — E66.9 OBESITY (BMI 30-39.9): ICD-10-CM

## 2025-06-27 DIAGNOSIS — J45.909 UNCOMPLICATED ASTHMA, UNSPECIFIED ASTHMA SEVERITY, UNSPECIFIED WHETHER PERSISTENT: ICD-10-CM

## 2025-06-27 DIAGNOSIS — R06.09 DYSPNEA ON EXERTION: Primary | ICD-10-CM

## 2025-06-27 DIAGNOSIS — G47.30 SLEEP APNEA, UNSPECIFIED TYPE: ICD-10-CM

## 2025-06-27 DIAGNOSIS — C62.91 SEMINOMA OF RIGHT TESTIS, STAGE 1 (HCC): ICD-10-CM

## 2025-06-27 DIAGNOSIS — R06.09 DYSPNEA ON EXERTION: ICD-10-CM

## 2025-06-27 DIAGNOSIS — D75.1 POLYCYTHEMIA: ICD-10-CM

## 2025-06-27 LAB
ALBUMIN SERPL-MCNC: 3.9 G/DL (ref 3.5–5)
ALBUMIN/GLOB SERPL: 1.1 (ref 1–1.9)
ALP SERPL-CCNC: 56 U/L (ref 40–129)
ALT SERPL-CCNC: 70 U/L (ref 8–55)
ANION GAP SERPL CALC-SCNC: 17 MMOL/L (ref 7–16)
AST SERPL-CCNC: 44 U/L (ref 15–37)
BASOPHILS # BLD: 0.04 K/UL (ref 0–0.2)
BASOPHILS NFR BLD: 0.7 % (ref 0–2)
BILIRUB SERPL-MCNC: 1.1 MG/DL (ref 0–1.2)
BUN SERPL-MCNC: 15 MG/DL (ref 6–23)
CALCIUM SERPL-MCNC: 10.2 MG/DL (ref 8.8–10.2)
CHLORIDE SERPL-SCNC: 97 MMOL/L (ref 98–107)
CO2 SERPL-SCNC: 25 MMOL/L (ref 20–29)
CREAT SERPL-MCNC: 1.33 MG/DL (ref 0.8–1.3)
D DIMER PPP FEU-MCNC: <0.27 UG/ML(FEU)
DIFFERENTIAL METHOD BLD: ABNORMAL
EOSINOPHIL # BLD: 0.17 K/UL (ref 0–0.8)
EOSINOPHIL NFR BLD: 2.8 % (ref 0.5–7.8)
ERYTHROCYTE [DISTWIDTH] IN BLOOD BY AUTOMATED COUNT: 15.9 % (ref 11.9–14.6)
EXPIRATORY TIME: NORMAL
FEF 25-75% %PRED-PRE: NORMAL
FEF 25-75% PRED: NORMAL
FEF 25-75-PRE: NORMAL
FENO: 30 PPB
FEV1 %PRED-PRE: 93 %
FEV1 PRED: NORMAL
FEV1/FVC %PRED-PRE: NORMAL
FEV1/FVC PRED: NORMAL
FEV1/FVC: 84 %
FEV1: 4.03 L
FVC %PRED-PRE: 88 %
FVC PRED: NORMAL
FVC: 4.8 L
GLOBULIN SER CALC-MCNC: 3.5 G/DL (ref 2.3–3.5)
GLUCOSE SERPL-MCNC: 91 MG/DL (ref 70–99)
HCG SERPL-ACNC: <1 MIU/ML
HCT VFR BLD AUTO: 56.9 % (ref 41.1–50.3)
HGB BLD-MCNC: 19.1 G/DL (ref 13.6–17.2)
IMM GRANULOCYTES # BLD AUTO: 0.02 K/UL (ref 0–0.5)
IMM GRANULOCYTES NFR BLD AUTO: 0.3 % (ref 0–5)
LDH SERPL L TO P-CCNC: 178 U/L (ref 127–281)
LYMPHOCYTES # BLD: 2.01 K/UL (ref 0.5–4.6)
LYMPHOCYTES NFR BLD: 33.1 % (ref 13–44)
MCH RBC QN AUTO: 29 PG (ref 26.1–32.9)
MCHC RBC AUTO-ENTMCNC: 33.6 G/DL (ref 31.4–35)
MCV RBC AUTO: 86.5 FL (ref 82–102)
MONOCYTES # BLD: 0.96 K/UL (ref 0.1–1.3)
MONOCYTES NFR BLD: 15.8 % (ref 4–12)
NEUTS SEG # BLD: 2.87 K/UL (ref 1.7–8.2)
NEUTS SEG NFR BLD: 47.3 % (ref 43–78)
NRBC # BLD: 0 K/UL (ref 0–0.2)
PEF %PRED-PRE: NORMAL
PEF PRED: NORMAL
PEF-PRE: NORMAL
PLATELET # BLD AUTO: 308 K/UL (ref 150–450)
PMV BLD AUTO: 10.4 FL (ref 9.4–12.3)
POTASSIUM SERPL-SCNC: 3.4 MMOL/L (ref 3.5–5.1)
PROT SERPL-MCNC: 7.4 G/DL (ref 6.3–8.2)
RBC # BLD AUTO: 6.58 M/UL (ref 4.23–5.6)
SODIUM SERPL-SCNC: 139 MMOL/L (ref 136–145)
WBC # BLD AUTO: 6.1 K/UL (ref 4.3–11.1)

## 2025-06-27 RX ORDER — ALBUTEROL SULFATE 90 UG/1
2 INHALANT RESPIRATORY (INHALATION) EVERY 4 HOURS PRN
COMMUNITY
Start: 2025-05-29

## 2025-06-27 ASSESSMENT — PULMONARY FUNCTION TESTS
FENO: 30
FEV1: 4.03
FVC_PERCENT_PREDICTED_PRE: 88
FVC: 4.80
FEV1/FVC: 84
FEV1_PERCENT_PREDICTED_PRE: 93

## 2025-06-27 NOTE — PATIENT INSTRUCTIONS
Today we talked about:   Shortness of breath  Could be a blood clot (worst case scenario)   Will get D-dimer and if high rule that out with a CT.  If d-dimer is low, it's not a blood clot  Could be high hemoglobin and blood viscosity  Will check cbc today and if high would consider efforts to get the hemoglobin down to 15.  Phlebotomy more often  Lower dose of testosterone  Make sure night oxygen is okay  Could be asthma that is pretty well treated with the advair. Probably isn't, though.  Would stop the advair and monitor for symptoms.    No evidence of asthma on spirometry today.   FENO test (cloud) was not highly suggestive of asthma.    If symptoms worsen off advair, we should do a methacholine challenge test to assess formally for asthma  Sleep apnea could be driving up your blood pressure and causing low oxygen at night.  This could make your hemoglobin higher.  Will order a home sleep test to reassess.

## 2025-06-28 ENCOUNTER — RESULTS FOLLOW-UP (OUTPATIENT)
Dept: PULMONOLOGY | Age: 45
End: 2025-06-28

## 2025-06-30 LAB — AFP-TM SERPL-MCNC: <1.82 NG/ML (ref 0–8.3)

## 2025-07-14 ENCOUNTER — OFFICE VISIT (OUTPATIENT)
Dept: ONCOLOGY | Age: 45
End: 2025-07-14
Payer: COMMERCIAL

## 2025-07-14 VITALS
DIASTOLIC BLOOD PRESSURE: 82 MMHG | SYSTOLIC BLOOD PRESSURE: 125 MMHG | HEART RATE: 85 BPM | OXYGEN SATURATION: 95 % | WEIGHT: 232 LBS | HEIGHT: 72 IN | BODY MASS INDEX: 31.42 KG/M2

## 2025-07-14 DIAGNOSIS — D75.1 SECONDARY POLYCYTHEMIA: ICD-10-CM

## 2025-07-14 DIAGNOSIS — C62.91 SEMINOMA OF RIGHT TESTIS, STAGE 1 (HCC): ICD-10-CM

## 2025-07-14 DIAGNOSIS — R06.09 DYSPNEA ON EXERTION: ICD-10-CM

## 2025-07-14 DIAGNOSIS — G47.30 SLEEP APNEA, UNSPECIFIED TYPE: Primary | ICD-10-CM

## 2025-07-14 DIAGNOSIS — C62.91 SEMINOMA OF RIGHT TESTIS, STAGE 1 (HCC): Primary | ICD-10-CM

## 2025-07-14 PROCEDURE — 99214 OFFICE O/P EST MOD 30 MIN: CPT | Performed by: INTERNAL MEDICINE

## 2025-07-14 ASSESSMENT — PATIENT HEALTH QUESTIONNAIRE - PHQ9
SUM OF ALL RESPONSES TO PHQ QUESTIONS 1-9: 0
1. LITTLE INTEREST OR PLEASURE IN DOING THINGS: NOT AT ALL
2. FEELING DOWN, DEPRESSED OR HOPELESS: NOT AT ALL
SUM OF ALL RESPONSES TO PHQ QUESTIONS 1-9: 0

## 2025-07-14 NOTE — PROGRESS NOTES
[facility]: Office Visit Established Patient    Chief Complaint:    Chief Complaint   Patient presents with    Follow-up    results of scan     History of Present Illness:  Mr. Dacosta is a 44 y.o. male who presents today for follow-up regarding seminoma.  He presented to [facility] on 9/8/22 reporting a several week history of right testicular and lower abdominal pain.  He also reported that his right testis had become firmer over time. He had a scrotal ultrasound done at [facility] on 8/24/22 which showed a diffusely abnormal right testis, suspicious for testicular malignancy. Labs drawn on 9/12/22 were significant for elevated beta HCG at 13. AFP was WNL at 1.30; PSA was WNL at 1.3, LD was WNL at 169; and testosterone was WNL at 418. CT of the chest, abdomen, and pelvis was performed on 9/16/22. Despite limited evaluation of testicular mass by CT, there was asymmetric increased attenuation of the right testis noted, which could reflect a testicular malignancy; no evidence of metastatic lymphadenopathy; a 3 mm right lower lobe lung nodule; and multiple non-obstructing right renal calculi with mild right hydroureteronephrosis and no visualized obstructing ureteral stone. On 9/22/22 patient underwent right radical orchiectomy with Dr. Moreno Humphrey. 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  His pathologic stage was T1b (due to size over 3cm), clinically T1N0M0.  We recommended observation due to the stage IA disease.    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 He is doing very well clinically, no complaints. He continues on T replacement but they are looking to decrease the dose due to his polycythemia.         Review of Systems:  Constitutional: Negative.   HENT: Negative.   Eyes: Negative.   Respiratory: Negative.   Cardiovascular:

## 2025-07-14 NOTE — PATIENT INSTRUCTIONS
Patient Information from Today's Visit    The members of your [facility] are listed below:    Physician Provider: Dr. Gregory Madrid  Advanced Practice Clinician: Azucena Taveras  Registered Nurse: Rigoberto STEINBERG   Nurse Navigator: Rhonda KAYE RN  Medical Assistant: Shannon \"Johanny\" HANNA.   : Shasta \"Nancy\" C.   Supportive Care Services: GWEN Morris    Diagnosis (Information Sheet Provided on Day of Diagnosis): Testicular Cancer    Follow Up Instructions:   1 year    Has Treatment Plan Been Finalized? N/A     Current Labs:   No visits with results within 3 Day(s) from this visit.   Latest known visit with results is:   Orders Only on 06/27/2025   Component Date Value Ref Range Status    WBC 06/27/2025 6.1  4.3 - 11.1 K/uL Final    RBC 06/27/2025 6.58 (H)  4.23 - 5.6 M/uL Final    Hemoglobin 06/27/2025 19.1 (H)  13.6 - 17.2 g/dL Final    Hematocrit 06/27/2025 56.9 (H)  41.1 - 50.3 % Final    MCV 06/27/2025 86.5  82 - 102 FL Final    MCH 06/27/2025 29.0  26.1 - 32.9 PG Final    MCHC 06/27/2025 33.6  31.4 - 35.0 g/dL Final    RDW 06/27/2025 15.9 (H)  11.9 - 14.6 % Final    Platelets 06/27/2025 308  150 - 450 K/uL Final    MPV 06/27/2025 10.4  9.4 - 12.3 FL Final    nRBC 06/27/2025 0.00  0.0 - 0.2 K/uL Final    **Note: Absolute NRBC parameter is now reported with Hemogram**    Differential Type 06/27/2025 AUTOMATED    Final    Neutrophils % 06/27/2025 47.3  43.0 - 78.0 % Final    Lymphocytes % 06/27/2025 33.1  13.0 - 44.0 % Final    Monocytes % 06/27/2025 15.8 (H)  4.0 - 12.0 % Final    Eosinophils % 06/27/2025 2.8  0.5 - 7.8 % Final    Basophils % 06/27/2025 0.7  0.0 - 2.0 % Final    Immature Granulocytes % 06/27/2025 0.3  0.0 - 5.0 % Final    Neutrophils Absolute 06/27/2025 2.87  1.70 - 8.20 K/UL Final    Lymphocytes Absolute 06/27/2025 2.01  0.50 - 4.60 K/UL Final    Monocytes Absolute 06/27/2025 0.96  0.10 - 1.30 K/UL Final    Eosinophils Absolute 06/27/2025 0.17  0.00 - 0.80 K/UL Final    Basophils

## (undated) DEVICE — SUTURE MCRYL SZ 4-0 L27IN ABSRB UD L19MM PS-2 1/2 CIR PRIM Y426H

## (undated) DEVICE — PREMIUM WET SKIN PREP TRAY: Brand: MEDLINE INDUSTRIES, INC.

## (undated) DEVICE — DRAIN SURG PENROSE 0.25X12 IN CLOSED WND DRAINAGE PREM SIL

## (undated) DEVICE — ADHESIVE LIQ 2OZ ADJUNCT FOR DSG MASTISOL

## (undated) DEVICE — SUTURE VCRL SZ 3-0 L27IN ABSRB UD L26MM SH 1/2 CIR J416H

## (undated) DEVICE — BLADE CLIPPER GEN PURP NS

## (undated) DEVICE — SOLUTION IRRIG 1000ML 09% SOD CHL USP PIC PLAS CONTAINER

## (undated) DEVICE — NEEDLE HYPO 25GA L1.5IN BLU POLYPR HUB S STL REG BVL STR

## (undated) DEVICE — SUTURE PERMAHAND SZ 0 L30IN NONABSORBABLE BLK SILK BRAID A306H

## (undated) DEVICE — GAUZE,SPONGE,4"X4",16PLY,STRL,LF,10/TRAY: Brand: MEDLINE

## (undated) DEVICE — 3M™ TEGADERM™ TRANSPARENT FILM DRESSING FRAME STYLE, 1626W, 4 IN X 4-3/4 IN (10 CM X 12 CM), 50/CT 4CT/CASE: Brand: 3M™ TEGADERM™

## (undated) DEVICE — MINOR SPLIT GENERAL: Brand: MEDLINE INDUSTRIES, INC.

## (undated) DEVICE — SUTURE VCRL SZ 0 L27IN ABSRB VLT L26MM CT-2 1/2 CIR J334H

## (undated) DEVICE — SUTURE SZ 0 27IN 5/8 CIR UR-6  TAPER PT VIOLET ABSRB VICRYL J603H

## (undated) DEVICE — STRIP,CLOSURE,WOUND,MEDI-STRIP,1/2X4: Brand: MEDLINE